# Patient Record
Sex: FEMALE | NOT HISPANIC OR LATINO | Employment: OTHER | URBAN - METROPOLITAN AREA
[De-identification: names, ages, dates, MRNs, and addresses within clinical notes are randomized per-mention and may not be internally consistent; named-entity substitution may affect disease eponyms.]

---

## 2021-02-24 ENCOUNTER — IMMUNIZATION (OUTPATIENT)
Dept: VACCINE CLINIC | Facility: HOSPITAL | Age: 66
End: 2021-02-24

## 2021-02-24 PROCEDURE — 0001A: CPT | Performed by: INTERNAL MEDICINE

## 2021-02-24 PROCEDURE — 91300 HC SARSCOV02 VAC 30MCG/0.3ML IM: CPT | Performed by: INTERNAL MEDICINE

## 2021-03-17 ENCOUNTER — IMMUNIZATION (OUTPATIENT)
Dept: VACCINE CLINIC | Facility: HOSPITAL | Age: 66
End: 2021-03-17

## 2021-03-17 PROCEDURE — 0002A: CPT | Performed by: INTERNAL MEDICINE

## 2021-03-17 PROCEDURE — 91300 HC SARSCOV02 VAC 30MCG/0.3ML IM: CPT | Performed by: INTERNAL MEDICINE

## 2023-04-20 ENCOUNTER — APPOINTMENT (OUTPATIENT)
Dept: GENERAL RADIOLOGY | Facility: HOSPITAL | Age: 68
End: 2023-04-20
Payer: MEDICARE

## 2023-04-20 ENCOUNTER — HOSPITAL ENCOUNTER (EMERGENCY)
Facility: HOSPITAL | Age: 68
Discharge: HOME | End: 2023-04-20
Attending: EMERGENCY MEDICINE
Payer: MEDICARE

## 2023-04-20 ENCOUNTER — APPOINTMENT (OUTPATIENT)
Dept: CT IMAGING | Facility: HOSPITAL | Age: 68
End: 2023-04-20
Payer: MEDICARE

## 2023-04-20 VITALS
SYSTOLIC BLOOD PRESSURE: 124 MMHG | HEIGHT: 63 IN | WEIGHT: 170 LBS | RESPIRATION RATE: 18 BRPM | BODY MASS INDEX: 30.12 KG/M2 | OXYGEN SATURATION: 99 % | TEMPERATURE: 99 F | HEART RATE: 69 BPM | DIASTOLIC BLOOD PRESSURE: 97 MMHG

## 2023-04-20 DIAGNOSIS — K21.9 GASTROESOPHAGEAL REFLUX DISEASE, UNSPECIFIED WHETHER ESOPHAGITIS PRESENT: Primary | ICD-10-CM

## 2023-04-20 DIAGNOSIS — K29.70 GASTRITIS WITHOUT BLEEDING, UNSPECIFIED CHRONICITY, UNSPECIFIED GASTRITIS TYPE: ICD-10-CM

## 2023-04-20 LAB
ALBUMIN SERPL-MCNC: 3.6 G/DL (ref 3.5–5.2)
ALBUMIN/GLOB SERPL: 1.3 G/DL
ALP SERPL-CCNC: 75 U/L (ref 39–117)
ALT SERPL W P-5'-P-CCNC: 14 U/L (ref 1–33)
ANION GAP SERPL CALCULATED.3IONS-SCNC: 7.5 MMOL/L (ref 5–15)
AST SERPL-CCNC: 15 U/L (ref 1–32)
BACTERIA UR QL AUTO: ABNORMAL /HPF
BASOPHILS # BLD AUTO: 0.04 10*3/MM3 (ref 0–0.2)
BASOPHILS NFR BLD AUTO: 0.6 % (ref 0–1.5)
BILIRUB SERPL-MCNC: 0.2 MG/DL (ref 0–1.2)
BILIRUB UR QL STRIP: NEGATIVE
BUN SERPL-MCNC: 13 MG/DL (ref 8–23)
BUN/CREAT SERPL: 11.7 (ref 7–25)
CALCIUM SPEC-SCNC: 8.8 MG/DL (ref 8.6–10.5)
CHLORIDE SERPL-SCNC: 106 MMOL/L (ref 98–107)
CLARITY UR: ABNORMAL
CO2 SERPL-SCNC: 26.5 MMOL/L (ref 22–29)
COLOR UR: YELLOW
CREAT SERPL-MCNC: 1.11 MG/DL (ref 0.57–1)
DEPRECATED RDW RBC AUTO: 49.1 FL (ref 37–54)
EGFRCR SERPLBLD CKD-EPI 2021: 53.9 ML/MIN/1.73
EOSINOPHIL # BLD AUTO: 0.28 10*3/MM3 (ref 0–0.4)
EOSINOPHIL NFR BLD AUTO: 3.9 % (ref 0.3–6.2)
ERYTHROCYTE [DISTWIDTH] IN BLOOD BY AUTOMATED COUNT: 15.5 % (ref 12.3–15.4)
GLOBULIN UR ELPH-MCNC: 2.7 GM/DL
GLUCOSE SERPL-MCNC: 133 MG/DL (ref 65–99)
GLUCOSE UR STRIP-MCNC: NEGATIVE MG/DL
HCT VFR BLD AUTO: 37.1 % (ref 34–46.6)
HGB BLD-MCNC: 11.6 G/DL (ref 12–15.9)
HGB UR QL STRIP.AUTO: NEGATIVE
HOLD SPECIMEN: NORMAL
HOLD SPECIMEN: NORMAL
HYALINE CASTS UR QL AUTO: ABNORMAL /LPF
IMM GRANULOCYTES # BLD AUTO: 0.01 10*3/MM3 (ref 0–0.05)
IMM GRANULOCYTES NFR BLD AUTO: 0.1 % (ref 0–0.5)
KETONES UR QL STRIP: ABNORMAL
LEUKOCYTE ESTERASE UR QL STRIP.AUTO: ABNORMAL
LIPASE SERPL-CCNC: 33 U/L (ref 13–60)
LYMPHOCYTES # BLD AUTO: 2.09 10*3/MM3 (ref 0.7–3.1)
LYMPHOCYTES NFR BLD AUTO: 29.1 % (ref 19.6–45.3)
MAGNESIUM SERPL-MCNC: 2.2 MG/DL (ref 1.6–2.4)
MCH RBC QN AUTO: 26.8 PG (ref 26.6–33)
MCHC RBC AUTO-ENTMCNC: 31.3 G/DL (ref 31.5–35.7)
MCV RBC AUTO: 85.7 FL (ref 79–97)
MONOCYTES # BLD AUTO: 0.61 10*3/MM3 (ref 0.1–0.9)
MONOCYTES NFR BLD AUTO: 8.5 % (ref 5–12)
NEUTROPHILS NFR BLD AUTO: 4.16 10*3/MM3 (ref 1.7–7)
NEUTROPHILS NFR BLD AUTO: 57.8 % (ref 42.7–76)
NITRITE UR QL STRIP: NEGATIVE
NRBC BLD AUTO-RTO: 0 /100 WBC (ref 0–0.2)
PH UR STRIP.AUTO: 6 [PH] (ref 5–8)
PLATELET # BLD AUTO: 289 10*3/MM3 (ref 140–450)
PMV BLD AUTO: 9.9 FL (ref 6–12)
POTASSIUM SERPL-SCNC: 4.2 MMOL/L (ref 3.5–5.2)
PROT SERPL-MCNC: 6.3 G/DL (ref 6–8.5)
PROT UR QL STRIP: NEGATIVE
QT INTERVAL: 396 MS
RBC # BLD AUTO: 4.33 10*6/MM3 (ref 3.77–5.28)
RBC # UR STRIP: ABNORMAL /HPF
REF LAB TEST METHOD: ABNORMAL
SODIUM SERPL-SCNC: 140 MMOL/L (ref 136–145)
SP GR UR STRIP: 1.02 (ref 1–1.03)
SQUAMOUS #/AREA URNS HPF: ABNORMAL /HPF
TROPONIN T SERPL HS-MCNC: 7 NG/L
UROBILINOGEN UR QL STRIP: ABNORMAL
WBC # UR STRIP: ABNORMAL /HPF
WBC NRBC COR # BLD: 7.19 10*3/MM3 (ref 3.4–10.8)
WHOLE BLOOD HOLD COAG: NORMAL
WHOLE BLOOD HOLD SPECIMEN: NORMAL

## 2023-04-20 PROCEDURE — 99284 EMERGENCY DEPT VISIT MOD MDM: CPT

## 2023-04-20 PROCEDURE — 87086 URINE CULTURE/COLONY COUNT: CPT

## 2023-04-20 PROCEDURE — 84484 ASSAY OF TROPONIN QUANT: CPT

## 2023-04-20 PROCEDURE — 70450 CT HEAD/BRAIN W/O DYE: CPT

## 2023-04-20 PROCEDURE — 36415 COLL VENOUS BLD VENIPUNCTURE: CPT

## 2023-04-20 PROCEDURE — 80053 COMPREHEN METABOLIC PANEL: CPT

## 2023-04-20 PROCEDURE — 93010 ELECTROCARDIOGRAM REPORT: CPT | Performed by: INTERNAL MEDICINE

## 2023-04-20 PROCEDURE — 83735 ASSAY OF MAGNESIUM: CPT

## 2023-04-20 PROCEDURE — 81001 URINALYSIS AUTO W/SCOPE: CPT

## 2023-04-20 PROCEDURE — 93005 ELECTROCARDIOGRAM TRACING: CPT

## 2023-04-20 PROCEDURE — 83690 ASSAY OF LIPASE: CPT | Performed by: PHYSICIAN ASSISTANT

## 2023-04-20 PROCEDURE — 85025 COMPLETE CBC W/AUTO DIFF WBC: CPT

## 2023-04-20 PROCEDURE — 71045 X-RAY EXAM CHEST 1 VIEW: CPT

## 2023-04-20 RX ORDER — ALUMINA, MAGNESIA, AND SIMETHICONE 2400; 2400; 240 MG/30ML; MG/30ML; MG/30ML
15 SUSPENSION ORAL ONCE
Status: COMPLETED | OUTPATIENT
Start: 2023-04-20 | End: 2023-04-20

## 2023-04-20 RX ORDER — SUCRALFATE 1 G/1
1 TABLET ORAL 4 TIMES DAILY
Qty: 56 TABLET | Refills: 0 | Status: SHIPPED | OUTPATIENT
Start: 2023-04-20 | End: 2023-05-04

## 2023-04-20 RX ORDER — LIDOCAINE HYDROCHLORIDE 20 MG/ML
15 SOLUTION OROPHARYNGEAL ONCE
Status: COMPLETED | OUTPATIENT
Start: 2023-04-20 | End: 2023-04-20

## 2023-04-20 RX ORDER — SODIUM CHLORIDE 0.9 % (FLUSH) 0.9 %
10 SYRINGE (ML) INJECTION AS NEEDED
Status: DISCONTINUED | OUTPATIENT
Start: 2023-04-20 | End: 2023-04-20 | Stop reason: HOSPADM

## 2023-04-20 RX ADMIN — LIDOCAINE HYDROCHLORIDE 15 ML: 20 SOLUTION ORAL; TOPICAL at 15:58

## 2023-04-20 RX ADMIN — ALUMINUM HYDROXIDE, MAGNESIUM HYDROXIDE, AND DIMETHICONE 15 ML: 400; 400; 40 SUSPENSION ORAL at 15:56

## 2023-04-20 NOTE — ED TRIAGE NOTES
Tyrone sutherland nurse saw pt today. States that she had some confusion and low blood pressure today. Family reports being off balance with headache for the last couple days. Pt reports fatigue. Pt answers all questions appropriately.

## 2023-04-20 NOTE — ED PROVIDER NOTES
TRIAGE PROVIDER NOTE    I personally performed a brief face-to-face medical evaluation of the patient upon their arrival to the emergency department.  This exam was performed in an effort to decrease the time from intake to seeing a provider and to decrease delays in care.  The history of present illness is condensed.  Other providers may see the patient as well if deemed necessary after this evaluation.    HPI: Meghan Adrian is a 68 y.o. female with a PMH significant for GERD who presents to the ED c/o epigastric abdominal pain with intermittent lightheadedness over the past couple of weeks.  The patient has fallen several times secondary to lightheadedness that is primarily present with position change.  She admits to nausea with several episodes of vomiting.  She relates the symptoms to her extensive history of reflux and gastric band surgery.  No recent abdominal surgeries.  No injuries over the past several weeks.  She does admit to an aching discomfort in her epigastrium that waxes and wanes in intensity with no obvious provocative activity over the course of that time.      FOCUSED PHYSICAL EXAM:  ED Triage Vitals   Temp Heart Rate Resp BP SpO2   04/20/23 1450 04/20/23 1450 04/20/23 1450 04/20/23 1455 04/20/23 1450   99 °F (37.2 °C) 97 18 114/78 96 %      Temp src Heart Rate Source Patient Position BP Location FiO2 (%)   -- 04/20/23 1455 04/20/23 1455 04/20/23 1455 --    Monitor Sitting Left arm      General: No acute distress  Lungs: Clear to auscultation bilaterally, no wheezes  Heart: Regular rate and rhythm, no murmur  Abdomen: Soft, nontender, nondistended, skin several scattered abdominal incisional scars from remote operations.  Extremities: No gross injury or deformity      ASSESSMENT/PLAN:  Patient presentation and evaluation initially concerning for possible dehydration.  She does have nausea and vomiting related to GERD and reflux symptoms that have been causing her to be off balance and  lightheaded.  Plan for abdominal pain evaluation with basic labs and lipase as well as cardiac evaluation with troponin and EKG.    Orders Placed This Encounter   Procedures   • XR Chest 1 View   • CT Head Without Contrast   • [place] Draw   • Comprehensive Metabolic Panel   • Single High Sensitivity Troponin T   • Magnesium   • Urinalysis With Culture If Indicated -   • CBC Auto Differential   • Lipase   • NPO Diet NPO Type: Strict NPO   • Undress & Gown   • Cardiac Monitoring   • Continuous Pulse Oximetry   • Vital Signs   • Orthostatic Blood Pressure   • Orthostatic Vitals   • Oxygen Therapy- Nasal Cannula; 2 LPM; Titrate for SPO2: 92%, Greater Than or Equal To   • POC Glucose Once   • ECG 12 Lead ED Triage Standing Order; Weak / Dizzy / AMS   • Insert Peripheral IV   • Fall Precautions   • CBC & Differential   • Green Top (Gel)   • Lavender Top   • Gold Top - SST   • Light Blue Top        Provider Attestation:  I personally reviewed the past medical history, past surgical history, social history, family history, current medications, and allergies as they appear in the chart.    1515 I, Jose Daniel Pichardo PA-C, I evaluated the patient briefly at triage and performed an evaluation.  The contents of this note to this point have been provided by myself.     ***

## 2023-04-21 LAB — BACTERIA SPEC AEROBE CULT: NO GROWTH

## 2023-04-21 NOTE — DISCHARGE INSTRUCTIONS
Avoid alcohol caffeine nicotine and foods and drinks that specifically worsen your symptoms  Take the Carafate as prescribed, you can also try over-the-counter Gaviscon as needed for breakthrough symptoms  Call your gastroenterologist tomorrow to schedule follow-up  Stay well-hydrated

## 2023-04-21 NOTE — ED PROVIDER NOTES
MD ATTESTATION NOTE    The TOM and I have discussed this patient's history, physical exam, and treatment plan.  I have reviewed the documentation and personally had a face to face interaction with the patient. I affirm the documentation and agree with the treatment and plan.  The attached note describes my personal findings.      I provided a substantive portion of the care of the patient.  I personally performed the physical exam in its entirety, and below are my findings.  For this patient encounter, the patient wore surgical mask, I wore full protective PPE including N95 and eye protection.      Brief HPI: This patient presents for evaluation of some abdominal discomfort and  lightheadedness symptoms.  This is been going on for couple weeks apparently and she has had a very poor appetite with decreased food and liquid intake.  Apparently a health representative came to her house today to do a basic checkup and they were concerned because her blood pressure was reportedly low with systolic in the 90s range.  At the time of my evaluation, patient denies any chest pain or abdominal pain or difficulties breathing.    PHYSICAL EXAM  ED Triage Vitals   Temp Heart Rate Resp BP SpO2   04/20/23 1450 04/20/23 1450 04/20/23 1450 04/20/23 1455 04/20/23 1450   99 °F (37.2 °C) 97 18 114/78 96 %      Temp src Heart Rate Source Patient Position BP Location FiO2 (%)   -- 04/20/23 1455 04/20/23 1455 04/20/23 1455 --    Monitor Sitting Left arm          GENERAL: Pleasant lady, resting calmly bed, smiling, no diaphoresis, no acute distress  HENT: nares patent normocephalic and atraumatic, moist mucous membrane  EYES: no scleral icterus, EOMI  CV: regular rhythm, normal rate  RESPIRATORY: normal effort, lungs clear bilaterally  ABDOMEN: soft, no focal areas of tenderness to deep palpation, no masses palpable  MUSCULOSKELETAL: no deformity, no asymmetry  NEURO: alert, moves all extremities, follows commands  PSYCH:  calm,  "cooperative  SKIN: warm, dry    Vital signs and nursing notes reviewed.        Plan: Typical cardiac work-up initiated and her EKG and troponin were reassuring here.  Patient has not had any chest pain throughout this visit.  CT of the head was also ordered and it is reassuring without any worrisome intracranial findings.  Complete metabolic labs initiated and we also checked a lipase and urinalysis because of her complaint of abdominal discomfort.  There was 3+ bacteria in the urinalysis but it seemed to be contaminated with up to 20 squamous epithelial cells as well.  Patient does not have any flank pain or dysuria symptoms at all.  She is afebrile and nontoxic-appearing.  We observed her in the department for several hours here.  Her blood pressure remained normotensive throughout that entire time.  She is overall nontoxic-appearing.  I think she is safe to discharge home for continued outpatient management.  Advise prompt follow-up with PCP for repeat evaluation as soon as possible this week.  Discussed with the patient and her daughter and her friend the usual \"return to ER\" instructions for any worsening signs or symptoms if they arise.     Wei Castelan MD  04/20/23 2737    "

## 2023-06-06 ENCOUNTER — HOSPITAL ENCOUNTER (INPATIENT)
Facility: HOSPITAL | Age: 68
LOS: 3 days | Discharge: HOME | DRG: 544 | End: 2023-06-09
Attending: EMERGENCY MEDICINE | Admitting: INTERNAL MEDICINE
Payer: MEDICARE

## 2023-06-06 ENCOUNTER — APPOINTMENT (OUTPATIENT)
Dept: CT IMAGING | Facility: HOSPITAL | Age: 68
DRG: 544 | End: 2023-06-06
Payer: MEDICARE

## 2023-06-06 DIAGNOSIS — S22.059A CLOSED FRACTURE OF FIFTH THORACIC VERTEBRA, UNSPECIFIED FRACTURE MORPHOLOGY, INITIAL ENCOUNTER: Primary | ICD-10-CM
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
## 2023-06-06 LAB
ALBUMIN SERPL-MCNC: 3.6 G/DL (ref 3.5–5.2)
ALBUMIN/GLOB SERPL: 1.4 G/DL
ALP SERPL-CCNC: 62 U/L (ref 39–117)
ALT SERPL W P-5'-P-CCNC: 12 U/L (ref 1–33)
ANION GAP SERPL CALCULATED.3IONS-SCNC: 10 MMOL/L (ref 5–15)
AST SERPL-CCNC: 17 U/L (ref 1–32)
BASOPHILS # BLD AUTO: 0.04 10*3/MM3 (ref 0–0.2)
BASOPHILS NFR BLD AUTO: 0.6 % (ref 0–1.5)
BILIRUB SERPL-MCNC: <0.2 MG/DL (ref 0–1.2)
BUN SERPL-MCNC: 17 MG/DL (ref 8–23)
BUN/CREAT SERPL: 15.7 (ref 7–25)
CALCIUM SPEC-SCNC: 8.9 MG/DL (ref 8.6–10.5)
CHLORIDE SERPL-SCNC: 110 MMOL/L (ref 98–107)
CO2 SERPL-SCNC: 22 MMOL/L (ref 22–29)
CREAT SERPL-MCNC: 1.08 MG/DL (ref 0.57–1)
DEPRECATED RDW RBC AUTO: 44.5 FL (ref 37–54)
EGFRCR SERPLBLD CKD-EPI 2021: 55.7 ML/MIN/1.73
EOSINOPHIL # BLD AUTO: 0.28 10*3/MM3 (ref 0–0.4)
EOSINOPHIL NFR BLD AUTO: 4 % (ref 0.3–6.2)
ERYTHROCYTE [DISTWIDTH] IN BLOOD BY AUTOMATED COUNT: 14.6 % (ref 12.3–15.4)
GLOBULIN UR ELPH-MCNC: 2.6 GM/DL
GLUCOSE SERPL-MCNC: 98 MG/DL (ref 65–99)
HCT VFR BLD AUTO: 32.6 % (ref 34–46.6)
HGB BLD-MCNC: 10.5 G/DL (ref 12–15.9)
IMM GRANULOCYTES # BLD AUTO: 0.02 10*3/MM3 (ref 0–0.05)
IMM GRANULOCYTES NFR BLD AUTO: 0.3 % (ref 0–0.5)
LYMPHOCYTES # BLD AUTO: 2.64 10*3/MM3 (ref 0.7–3.1)
LYMPHOCYTES NFR BLD AUTO: 37.3 % (ref 19.6–45.3)
MCH RBC QN AUTO: 27.1 PG (ref 26.6–33)
MCHC RBC AUTO-ENTMCNC: 32.2 G/DL (ref 31.5–35.7)
MCV RBC AUTO: 84 FL (ref 79–97)
MONOCYTES # BLD AUTO: 0.67 10*3/MM3 (ref 0.1–0.9)
MONOCYTES NFR BLD AUTO: 9.5 % (ref 5–12)
NEUTROPHILS NFR BLD AUTO: 3.43 10*3/MM3 (ref 1.7–7)
NEUTROPHILS NFR BLD AUTO: 48.3 % (ref 42.7–76)
NRBC BLD AUTO-RTO: 0 /100 WBC (ref 0–0.2)
PLATELET # BLD AUTO: 248 10*3/MM3 (ref 140–450)
PMV BLD AUTO: 10 FL (ref 6–12)
POTASSIUM SERPL-SCNC: 4 MMOL/L (ref 3.5–5.2)
PROT SERPL-MCNC: 6.2 G/DL (ref 6–8.5)
RBC # BLD AUTO: 3.88 10*6/MM3 (ref 3.77–5.28)
SODIUM SERPL-SCNC: 142 MMOL/L (ref 136–145)
WBC NRBC COR # BLD: 7.08 10*3/MM3 (ref 3.4–10.8)

## 2023-06-06 PROCEDURE — 85025 COMPLETE CBC W/AUTO DIFF WBC: CPT | Performed by: INTERNAL MEDICINE

## 2023-06-06 PROCEDURE — 71250 CT THORAX DX C-: CPT

## 2023-06-06 PROCEDURE — 80053 COMPREHEN METABOLIC PANEL: CPT | Performed by: INTERNAL MEDICINE

## 2023-06-06 PROCEDURE — 99284 EMERGENCY DEPT VISIT MOD MDM: CPT

## 2023-06-06 RX ORDER — HYDROCODONE BITARTRATE AND ACETAMINOPHEN 7.5; 325 MG/1; MG/1
1 TABLET ORAL EVERY 6 HOURS PRN
COMMUNITY
End: 2023-06-09 | Stop reason: HOSPADM

## 2023-06-06 RX ORDER — SODIUM CHLORIDE 0.9 % (FLUSH) 0.9 %
10 SYRINGE (ML) INJECTION AS NEEDED
Status: DISCONTINUED | OUTPATIENT
Start: 2023-06-06 | End: 2023-06-09 | Stop reason: HOSPADM

## 2023-06-06 RX ORDER — HYDROCODONE BITARTRATE AND ACETAMINOPHEN 5; 325 MG/1; MG/1
1 TABLET ORAL EVERY 4 HOURS PRN
Status: DISCONTINUED | OUTPATIENT
Start: 2023-06-06 | End: 2023-06-07 | Stop reason: SDUPTHER

## 2023-06-06 RX ORDER — ERGOCALCIFEROL 1.25 MG/1
1 CAPSULE ORAL WEEKLY
COMMUNITY

## 2023-06-06 RX ORDER — NALOXONE HCL 0.4 MG/ML
0.4 VIAL (ML) INJECTION
Status: DISCONTINUED | OUTPATIENT
Start: 2023-06-06 | End: 2023-06-09 | Stop reason: HOSPADM

## 2023-06-06 RX ORDER — BUSPIRONE HYDROCHLORIDE 10 MG/1
1 TABLET ORAL 2 TIMES DAILY
COMMUNITY

## 2023-06-06 RX ORDER — MORPHINE SULFATE 2 MG/ML
1 INJECTION, SOLUTION INTRAMUSCULAR; INTRAVENOUS EVERY 4 HOURS PRN
Status: DISCONTINUED | OUTPATIENT
Start: 2023-06-06 | End: 2023-06-09 | Stop reason: HOSPADM

## 2023-06-06 RX ORDER — CITALOPRAM 40 MG/1
1 TABLET ORAL DAILY
COMMUNITY

## 2023-06-06 RX ORDER — ATORVASTATIN CALCIUM 40 MG/1
1 TABLET, FILM COATED ORAL DAILY
COMMUNITY

## 2023-06-06 RX ORDER — AMOXICILLIN 250 MG
2 CAPSULE ORAL 2 TIMES DAILY
Status: DISCONTINUED | OUTPATIENT
Start: 2023-06-06 | End: 2023-06-09 | Stop reason: HOSPADM

## 2023-06-06 RX ORDER — ACETAMINOPHEN 500 MG
1000 TABLET ORAL ONCE
Status: COMPLETED | OUTPATIENT
Start: 2023-06-06 | End: 2023-06-06

## 2023-06-06 RX ORDER — DEXLANSOPRAZOLE 60 MG/1
1 CAPSULE, DELAYED RELEASE ORAL DAILY
COMMUNITY

## 2023-06-06 RX ORDER — POLYETHYLENE GLYCOL 3350 17 G/17G
17 POWDER, FOR SOLUTION ORAL DAILY PRN
Status: DISCONTINUED | OUTPATIENT
Start: 2023-06-06 | End: 2023-06-09 | Stop reason: HOSPADM

## 2023-06-06 RX ORDER — SODIUM CHLORIDE 0.9 % (FLUSH) 0.9 %
10 SYRINGE (ML) INJECTION EVERY 12 HOURS SCHEDULED
Status: DISCONTINUED | OUTPATIENT
Start: 2023-06-06 | End: 2023-06-09 | Stop reason: HOSPADM

## 2023-06-06 RX ORDER — CEPHALEXIN 250 MG/1
250 CAPSULE ORAL NIGHTLY
COMMUNITY
End: 2023-06-09 | Stop reason: HOSPADM

## 2023-06-06 RX ORDER — BISACODYL 5 MG/1
5 TABLET, DELAYED RELEASE ORAL DAILY PRN
Status: DISCONTINUED | OUTPATIENT
Start: 2023-06-06 | End: 2023-06-09 | Stop reason: HOSPADM

## 2023-06-06 RX ORDER — ONDANSETRON 2 MG/ML
4 INJECTION INTRAMUSCULAR; INTRAVENOUS EVERY 6 HOURS PRN
Status: DISCONTINUED | OUTPATIENT
Start: 2023-06-06 | End: 2023-06-09 | Stop reason: HOSPADM

## 2023-06-06 RX ORDER — ALENDRONATE SODIUM 35 MG/1
1 TABLET ORAL
COMMUNITY

## 2023-06-06 RX ORDER — BUPROPION HYDROCHLORIDE 300 MG/1
1 TABLET ORAL DAILY
COMMUNITY

## 2023-06-06 RX ORDER — CETIRIZINE HYDROCHLORIDE 10 MG/1
1 TABLET ORAL DAILY
COMMUNITY

## 2023-06-06 RX ORDER — FLUCONAZOLE 100 MG/1
100 TABLET ORAL DAILY
COMMUNITY
End: 2023-06-09 | Stop reason: HOSPADM

## 2023-06-06 RX ORDER — AMITRIPTYLINE HYDROCHLORIDE 100 MG/1
1 TABLET, FILM COATED ORAL NIGHTLY
COMMUNITY

## 2023-06-06 RX ORDER — BISACODYL 10 MG
10 SUPPOSITORY, RECTAL RECTAL DAILY PRN
Status: DISCONTINUED | OUTPATIENT
Start: 2023-06-06 | End: 2023-06-09 | Stop reason: HOSPADM

## 2023-06-06 RX ORDER — FOLIC ACID 1 MG/1
1 TABLET ORAL DAILY
COMMUNITY

## 2023-06-06 RX ORDER — SODIUM CHLORIDE 9 MG/ML
40 INJECTION, SOLUTION INTRAVENOUS AS NEEDED
Status: DISCONTINUED | OUTPATIENT
Start: 2023-06-06 | End: 2023-06-09 | Stop reason: HOSPADM

## 2023-06-06 RX ORDER — FAMOTIDINE 20 MG/1
20 TABLET, FILM COATED ORAL 2 TIMES DAILY PRN
COMMUNITY
End: 2023-06-09 | Stop reason: HOSPADM

## 2023-06-06 RX ORDER — METOPROLOL SUCCINATE 50 MG/1
1 TABLET, EXTENDED RELEASE ORAL DAILY
COMMUNITY

## 2023-06-06 RX ORDER — LIDOCAINE 50 MG/G
1 PATCH TOPICAL ONCE
Status: COMPLETED | OUTPATIENT
Start: 2023-06-06 | End: 2023-06-07

## 2023-06-06 RX ADMIN — LIDOCAINE 1 PATCH: 50 PATCH CUTANEOUS at 17:27

## 2023-06-06 RX ADMIN — ACETAMINOPHEN 1000 MG: 500 TABLET, FILM COATED ORAL at 16:30

## 2023-06-06 NOTE — ED NOTES
"Nursing report ED to floor  Meghan Adrian  68 y.o.  female    HPI :   Chief Complaint   Patient presents with    Fall       Admitting doctor:   Shelby Sales MD    Admitting diagnosis:   The encounter diagnosis was Closed fracture of fifth thoracic vertebra, unspecified fracture morphology, initial encounter.    Code status:   Current Code Status       Date Active Code Status Order ID Comments User Context       6/6/2023 1930 CPR (Attempt to Resuscitate) [id number]  Shelby Sales MD ED        Question Answer    Code Status (Patient has no pulse and is not breathing) CPR (Attempt to Resuscitate)    Medical Interventions (Patient has pulse or is breathing) Full Support                    Allergies:   Patient has no known allergies.    Isolation:   No active isolations    Intake and Output  No intake or output data in the 24 hours ending 06/06/23 1950    Weight:       06/06/23  1424   Weight: 77.1 kg (170 lb)       Most recent vitals:   Vitals:    06/06/23 1423 06/06/23 1424   BP:  148/89   Pulse: 94    Resp: 18    Temp: 97.1 °F (36.2 °C)    SpO2: 94%    Weight:  77.1 kg (170 lb)   Height:  160 cm (63\")       Active LDAs/IV Access:   Lines, Drains & Airways       Active LDAs       Name Placement date Placement time Site Days    Peripheral IV 06/06/23 1910 Left Antecubital 06/06/23 1910  Antecubital  less than 1                    Labs (abnormal labs have a star):   Labs Reviewed - No data to display    EKG:   No orders to display       Meds given in ED:   Medications   lidocaine (LIDODERM) 5 % 1 patch (1 patch Transdermal Medication Applied 6/6/23 1727)   acetaminophen (TYLENOL) tablet 1,000 mg (1,000 mg Oral Given 6/6/23 1630)       Imaging results:  No radiology results for the last day    Ambulatory status:   - w/ assist    Social issues:   Social History     Socioeconomic History    Marital status: Unknown       NIH Stroke Scale:         Alexx Bullock RN  06/06/23 19:50 EDT       "

## 2023-06-06 NOTE — PROGRESS NOTES
Clinical Pharmacy Services: Medication History    Meghan Adrian is a 68 y.o. female presenting to [facility] for   Chief Complaint   Patient presents with    Fall       She  has no past medical history on file.    Allergies as of 06/06/2023    (No Known Allergies)       Medication information was obtained from: Patient   Pharmacy and Phone Number:     Prior to Admission Medications       Prescriptions Last Dose Informant Patient Reported? Taking?    alendronate (FOSAMAX) 35 MG tablet  Self Yes Yes    Take 1 tablet by mouth Every 7 (Seven) Days.    amitriptyline (ELAVIL) 100 MG tablet 6/5/2023 Self Yes Yes    Take 1 tablet by mouth Every Night.    atorvastatin (LIPITOR) 40 MG tablet 6/6/2023 Self Yes Yes    Take 1 tablet by mouth Daily.    buPROPion XL (WELLBUTRIN XL) 300 MG 24 hr tablet 6/6/2023 Self Yes Yes    Take 1 tablet by mouth Daily.    busPIRone (BUSPAR) 10 MG tablet 6/6/2023 Self Yes Yes    Take 1 tablet by mouth 2 (Two) Times a Day.    cephalexin (KEFLEX) 250 MG capsule 6/5/2023 Self Yes Yes    Take 1 capsule by mouth Every Night.    cetirizine (zyrTEC) 10 MG tablet 6/6/2023 Self Yes Yes    Take 1 tablet by mouth Daily.    citalopram (CeleXA) 40 MG tablet 6/6/2023 Self Yes Yes    Take 1 tablet by mouth Daily.    Cyanocobalamin 1000 MCG/ML kit  Self Yes Yes    Inject 1,000 mcg as directed Every 30 (Thirty) Days.    dexlansoprazole (DEXILANT) 60 MG capsule 6/6/2023 Self Yes Yes    Take 1 capsule by mouth Daily.    famotidine (PEPCID) 20 MG tablet 6/6/2023 Self Yes Yes    Take 1 tablet by mouth 2 (Two) Times a Day As Needed for Heartburn.    fluconazole (DIFLUCAN) 100 MG tablet 6/6/2023 Self Yes Yes    Take 1 tablet by mouth Daily.    folic acid (FOLVITE) 1 MG tablet 6/6/2023 Self Yes Yes    Take 1 tablet by mouth Daily.    HYDROcodone-acetaminophen (NORCO) 7.5-325 MG per tablet 6/6/2023 Self Yes Yes    Take 1 tablet by mouth Every 6 (Six) Hours As Needed for Moderate Pain.    metoprolol  succinate XL (TOPROL-XL) 50 MG 24 hr tablet 6/6/2023 Self Yes Yes    Take 1 tablet by mouth Daily.    vitamin D (ERGOCALCIFEROL) 1.25 MG (67858 UT) capsule capsule  Self Yes Yes    Take 1 capsule by mouth 1 (One) Time Per Week.              Medication notes:     This medication list is complete to the best of my knowledge as of 6/6/2023    Please call if questions.    Seble Garcia  Medication History Technician   [phone]    6/6/2023 19:33 EDT

## 2023-06-06 NOTE — ED NOTES
Patient reports generalized chest pain after falling forward down a hill last night. Patient reports increased pain w/ inspiration, cough, movement. Patient denies LOC, blood thinner use, neck pain, headache, blurred vision, nausea.

## 2023-06-06 NOTE — ED NOTES
Patient from home via pv; c/o a mechanical fall and injured the front part of her torso. Patient referred to er by pcp for chest xray.

## 2023-06-06 NOTE — ED PROVIDER NOTES
EMERGENCY DEPARTMENT ENCOUNTER    Room Number:  T02/02  Date of encounter:  6/6/2023  PCP: Provider, No Known  Historian: Patient  Full history not obtainable due to: None    HPI:  Chief Complaint: Chest wall pain    Context: Meghan Adrian is a 68 y.o. female who presents to the ED c/o aching discomfort to the anterior chest as well as bilateral ribs since a fall last night.  The patient lost her balance and fell forward onto a grass surface and has been having pain since then.  She has not attempted to alleviate symptoms prior to arrival with medications.  She was sent from her doctor's office today for further evaluation after complaining of the symptoms this morning.  She denies shortness of breath.  She does not believe she struck her head on the ground and certainly believes that she did not lose consciousness.  Does not take any blood thinners.  There is no neck pain.  There is no abdominal pain.      MEDICAL RECORD REVIEW:    Upon review of the medical record it appears the patient was evaluated in the office today with neurology for vitamin B12 deficiency and memory loss.  She had a normal urine culture on 4/20/2023 and a normal lipase on that same date.    PAST MEDICAL HISTORY    Active Ambulatory Problems     Diagnosis Date Noted    No Active Ambulatory Problems     Resolved Ambulatory Problems     Diagnosis Date Noted    No Resolved Ambulatory Problems     No Additional Past Medical History         PAST SURGICAL HISTORY  No past surgical history on file.      FAMILY HISTORY  No family history on file.      SOCIAL HISTORY  Social History     Socioeconomic History    Marital status: Unknown         ALLERGIES  Patient has no known allergies.        REVIEW OF SYSTEMS    All systems reviewed and marked as negative except as listed in HPI     PHYSICAL EXAM    I have reviewed the triage vital signs and nursing notes.    ED Triage Vitals   Temp Heart Rate Resp BP SpO2   06/06/23 1423 06/06/23 1423 06/06/23 1423  06/06/23 1424 06/06/23 1423   97.1 °F (36.2 °C) 94 18 148/89 94 %      Temp src Heart Rate Source Patient Position BP Location FiO2 (%)   -- -- -- -- --              Physical Exam  Constitutional:       General: She is not in acute distress.     Appearance: She is well-developed.   HENT:      Head: Normocephalic and atraumatic.   Eyes:      General: No scleral icterus.     Conjunctiva/sclera: Conjunctivae normal.   Neck:      Trachea: No tracheal deviation.   Cardiovascular:      Rate and Rhythm: Normal rate and regular rhythm.   Pulmonary:      Effort: Pulmonary effort is normal.      Breath sounds: Normal breath sounds.   Chest:       Abdominal:      Palpations: Abdomen is soft.      Tenderness: There is no abdominal tenderness.   Musculoskeletal:         General: No deformity.      Cervical back: Normal range of motion.   Lymphadenopathy:      Cervical: No cervical adenopathy.   Skin:     General: Skin is warm and dry.   Neurological:      Mental Status: She is alert and oriented to person, place, and time.   Psychiatric:         Behavior: Behavior normal.       Vital signs and nursing notes reviewed.            LAB RESULTS  No results found for this or any previous visit (from the past 24 hour(s)).    Ordered the above labs and independently reviewed the results.        RADIOLOGY  CT Chest Without Contrast Diagnostic    Result Date: 6/6/2023  CT CHEST WO CONTRAST DIAGNOSTIC-  Radiation dose reduction techniques were utilized, including automated exposure control and exposure modulation based on body size.  Clinical: Fell, anterior bilateral rib and sternal pain  FINDINGS: Compression deformity of the T5 vertebrae with approximately 50% volume loss. No sternal fracture, respiratory motion artifact. No rib fracture seen.  There is a lap band in position. There is atherosclerotic calcification of a normal diameter aorta. Cardiac size within normal limits, no pericardial abnormality seen. No mediastinal or hilar  mass/lymphadenopathy. Fine linear area of scarring versus discoid atelectasis at the base of the right lower lobe. The right lung is otherwise clear. The left lung is clear. No pleural effusion or suspicious pulmonary lesion seen. Limited images through the upper abdomen are unremarkable.  CONCLUSION: 1. Compression deformity T5 vertebrae. 2. No rib or sternal fracture seen. 3. No acute intrathoracic abnormality.   This report was finalized on 6/6/2023 5:30 PM by Dr. Santiago Cornelius M.D.       I ordered the above noted radiological studies. Independently reviewed by me and discussed with radiologist.  See dictation above for official radiology interpretation.      PROCEDURES    Procedures        MEDICATIONS GIVEN IN ER    Medications   lidocaine (LIDODERM) 5 % 1 patch (1 patch Transdermal Medication Applied 6/6/23 1727)   acetaminophen (TYLENOL) tablet 1,000 mg (1,000 mg Oral Given 6/6/23 1630)         PROGRESS, DATA ANALYSIS, CONSULTS, AND MEDICAL DECISION MAKING    All labs have been independently interpreted by me.  All radiology studies have been interpreted by me.  Discussion below represents my analysis of pertinent findings related to patient's condition, differential diagnosis, treatment plan and final disposition.    Patient presentation and evaluation consistent with uncomplicated thoracic compression fracture with 50% loss of height.  Due to the patient's pain and difficulty with ambulation secondary to her fracture I feel most appropriate to admit her to the hospital upon the recommendation from neurosurgery for MRI and neurosurgical consultation.  Patient and family are agreeable.    - Chronic or social conditions impacting care: None      DIFFERENTIAL DIAGNOSIS INCLUDE BUT NOT LIMITED TO:     Sternal fracture, rib fracture, vertebral fracture      Orders placed during this visit:  Orders Placed This Encounter   Procedures    CT Chest Without Contrast Diagnostic    Neurosurgery (on-call MD unless  specified)    [facility] (on-call MD unless specified) Details    Inpatient Admission         ED Course as of 06/06/23 1852 Tue Jun 06, 2023 1835 I discussed the case with MD Kusum with NS at this time regarding the patient.  I discussed work-up, results, concerns.  I discussed the consulting provider's desire for admission for NS consult and MRI   [DC]   1850 I discussed the case with MD Henrry at this time regarding the patient.  I discussed work-up, results, concerns.  I discussed the consulting provider's desire for med surg admit.   [DC]      ED Course User Index  [DC] Jose Daniel Pichardo PA       AS OF 18:52 EDT VITALS:    BP - 148/89  HR - 94  TEMP - 97.1 °F (36.2 °C)  02 SATS - 94%  Admit    1854 I rechecked the patient.  I discussed the patient's labs, radiology findings (including all incidental findings), diagnosis, and plan for admission. The patient understands and agrees with the plan.      DIAGNOSIS  Final diagnoses:   Closed fracture of fifth thoracic vertebra, unspecified fracture morphology, initial encounter         DISPOSITION  Admit    Pt masked in first look. I wore a surgical mask throughout my encounters with the pt. I performed hand hygiene on entry into the pt room and upon exit.     Dictated utilizing Dragon dictation     Note Disclaimer: At [facility], we believe that sharing information builds trust and better relationships. You are receiving this note because you recently visited [facility]. It is possible you will see health information before a provider has talked with you about it. This kind of information can be easy to misunderstand. To help you fully understand what it means for your health, we urge you to discuss this note with your provider.      Jose Daniel Pichardo PA  06/06/23 1855

## 2023-06-06 NOTE — ED PROVIDER NOTES
MD ATTESTATION NOTE    The TOM and I have discussed this patient's history, physical exam, and treatment plan.  I have reviewed the documentation and personally had a face to face interaction with the patient. I affirm the documentation and agree with the treatment and plan.  The attached note describes my personal findings.      I provided a substantive portion of the care of the patient.  I personally performed the physical exam in its entirety, and below are my findings.  For this patient encounter, the patient wore surgical mask, I wore full protective PPE including N95 and eye protection.      Brief HPI: Patient presents for evaluation of fall.  Patient states she fell last night down a hill.  Patient states she has pain in both ribs.  Did not hit her head.  Did not get knocked unconscious.  No abdominal pain.    PHYSICAL EXAM  ED Triage Vitals   Temp Heart Rate Resp BP SpO2   06/06/23 1423 06/06/23 1423 06/06/23 1423 06/06/23 1424 06/06/23 1423   97.1 °F (36.2 °C) 94 18 148/89 94 %      Temp src Heart Rate Source Patient Position BP Location FiO2 (%)   -- -- -- -- --                GENERAL: no acute distress  HENT: nares patent  EYES: no scleral icterus  CV: regular rhythm, normal rate  RESPIRATORY: normal effort. Tender bilateral chest   ABDOMEN: soft  MUSCULOSKELETAL: no deformity  NEURO: alert, moves all extremities, follows commands  PSYCH:  calm, cooperative  SKIN: warm, dry    Vital signs and nursing notes reviewed.        Plan: CT scan of the chest       Christos Boateng MD  06/06/23 3097

## 2023-06-06 NOTE — H&P
Internal medicine history and physical  INTERNAL MEDICINE   [facility]       Patient Identification:  Name: Meghan Adrian  Age: 68 y.o.  Sex: female  :  1955  MRN: [id number]                   Primary Care Physician: Provider, No Known                               Date of admission:2023    Chief Complaint: Tripped and fell face forward hitting her chest and knees on the ground last night while walking down the slope to help get the dog who got out of the house    History of Present Illness:   Patient is a 68-year-old female who has been evaluated for progressive memory loss over the course of the last year and a half and has had formal neuropsych evaluation at [facility] in 2022.  Patient has underlying history of hypertension, dyslipidemia and untreated sleep apnea.  Patient has history of frequent falls and did fall and 2022 when she was walking up the stairs and resulted in fracture of her right ankle.  She is also dealing with issues of grief and her   from hemorrhagic stroke last year.  In this background patient was visiting her daughter and her dog got out of the house.  She was going up to the dog and running down the hill when she lost her balance and fell face forward and developed bruise in her knees but did not hit her face.  She was able to get up and walk around and did not have any issues with weakness of arm or legs or inability to control her bowel and bladder but started complaining of discomfort in the chest and ribs.  Because of that she called her primary care provider and was sent to the emergency room for evaluation and x-rays.  Work-up in the emergency room including CT scan of the chest and head which showed evidence of T5 fracture.  No other acute process is seen.  Patient's case was discussed with spine surgery/neurosurgery on-call and it was recommended that patient would need hospitalization to monitor her clinical course and  evaluation by neurosurgery service to determine the best method of treatment.      Past Medical History:   Allergic rhinitis 07/13/2015    Arthritis 07/13/2015    Benign essential hypertension 03/11/2019    Diverticular disease 02/23/2016    Dysfunction of Eustachian tube 11/29/2018    Dyspnea 10/23/2015    Elevated fasting glucose 04/12/2016    Fatigue 04/05/2016    Gastroesophageal reflux disease 07/13/2015    Heart valve disorder 11/11/2015    Mixed hyperlipidemia 07/13/2015    Polyp of colon 05/07/2018    Patient encounter status 09/23/2014    Past history of procedure 11/11/2015    Body mass index 30+ - obesity 06/26/2018    Palpitations 10/14/2015    Needs influenza immunization 10/08/2019    Mixed anxiety and depressive disorder 07/13/2015    Melanocytic nevus of skin 02/02/2017    Insomnia 01/12/2016    Impacted cerumen 11/29/2018    Viral screening status 05/24/2018    Urinary tract infection 06/22/2014    Tachycardia 07/13/2015    Small bowel obstruction 06/22/2014    Requires a hepatitis A vaccination 04/26/2018    Prediabetes 01/11/2017    Polyphagia 07/12/2017    Vitamin D deficiency 06/10/2021    Neoplasm of uncertain behavior of skin 10/11/2021    Memory loss 07/12/2022    Bilateral impacted cerumen 07/12/2022    Serum creatinine raised 07/12/2022    Peripheral vascular disease 10/21/2022    Bladder muscle dysfunction - overactive 09/12/2022    Chronic back pain 09/12/2022    Sleep apnea 09/12/2022    Overactive bladder 12/06/2022    Hypertension 12/06/2022    Gastritis 05/07/2023    Hypotension 05/07/2023     Past Surgical History:   BACK SURGERY    BREAST SURGERY   breast reduction    CHOLECYSTECTOMY    COLONOSCOPY    ESOPHAGOGASTRODUODENOSCOPY    GALLBLADDER SURGERY    GASTRIC BYPASS    TUBAL LIGATION   Home Meds:   alendronate (FOSAMAX) 35 mg, Oral, Every 7 days, Take in the morning with a full glass of water, on an empty stomach, and do not take anything else by mouth or lie down for the next 30  "min.    amitriptyline (ELAVIL) 100 mg, Oral, Daily    atorvastatin (LIPITOR) 40 mg, Oral, Daily    buPROPion XL (WELLBUTRIN XL) 300 mg, Oral, Every morning, Do not crush, chew, or split.    busPIRone (BUSPAR) 10 mg, Oral, 2 times daily RT    cephalexin (KEFLEX) 250 mg, Oral, Nightly    cetirizine (ZYRTEC) 10 mg, Oral, Daily    citalopram (CeleXA) 40 MG tablet TAKE 1 TABLET BY MOUTH EVERY DAY    dexlansoprazole (DEXILANT) 60 mg, Oral, Daily, Do not crush or chew.    ergocalciferol (Vitamin D2) 1.25 MG (70442 UT) capsule Take 1 capsule (50,000 Units total) by mouth 1 (one) time per week    famotidine (PEPCID) 20 mg, Oral, 2 times daily PRN    folic acid (Folvite) 1 MG tablet TAKE 1 TABLET BY MOUTH EVERY DAY    HYDROcodone-acetaminophen (Norco) 7.5-325 MG tablet No dose, route, or frequency recorded.    metoprolol succinate XL (Toprol-XL) 50 MG 24 hr tablet TAKE 1 TABLET BY MOUTH EVERY DAY. DO NOT CRUSH OR CHEW    Vitamins/Minerals tablet 1 tablet, Oral, Daily     Current Meds:     Current Facility-Administered Medications:     lidocaine (LIDODERM) 5 % 1 patch, 1 patch, Transdermal, Once, Jose Daniel Pichardo PA, 1 patch at 06/06/23 1727  No current outpatient medications on file.  Allergies:  No Known Allergies  Social History:   Social History     Tobacco Use    Smoking status: Not on file    Smokeless tobacco: Not on file   Substance Use Topics    Alcohol use: Not on file      Family History:  No family history on file.       Review of Systems  See history of present illness and past medical history.     Remainder of ROS is negative.      Vitals:   /89   Pulse 94   Temp 97.1 °F (36.2 °C)   Resp 18   Ht 160 cm (63\")   Wt 77.1 kg (170 lb)   SpO2 94%   BMI 30.11 kg/m²   I/O: No intake or output data in the 24 hours ending 06/06/23 1928  Exam:  Patient is examined using the personal protective equipment as per guidelines from infection control for this particular patient as enacted.  Hand washing was performed " before and after patient interaction.  General Appearance:    Alert, cooperative, no distress, appears stated age   Head:    Normocephalic, without obvious abnormality, atraumatic   Eyes:    PERRL, conjunctiva/corneas clear, EOM's intact, both eyes   Ears:    Normal external ear canals, both ears   Nose:   Nares normal, septum midline, mucosa normal, no drainage    or sinus tenderness   Throat:   Lips, tongue, gums normal; oral mucosa pink and moist   Neck: No adenopathy noted.   Back:   Mid spine tenderness with point tenderness in the mid scapular area.   Lungs:     Clear to auscultation bilaterally, respirations unlabored   Chest Wall:    No tenderness or deformity    Heart:  S1-S2 regular pleasant   Abdomen:     Soft, non-tender, bowel sounds active all four quadrants,     no masses, no hepatomegaly, no splenomegaly   Extremities: Superficial bruising on bilateral knees with preserved range of motion and no joint line tenderness noted.  Patient is able to ambulate.  Obese soft nontender   Pulses:   Pulses palpable in all extremities; symmetric all extremities   Skin: Bruising noted.   Neurologic: Grossly nonfocal examination       Data Review:      I reviewed the patient's new clinical results.  No radiology results for the last 30 days.  CONCLUSION:   1. Compression deformity T5 vertebrae.   2. No rib or sternal fracture seen.   3. No acute intrathoracic abnormality.   Assessment:  Active Hospital Problems    Diagnosis  POA    **Closed fracture of fifth thoracic vertebra, unspecified fracture morphology, initial encounter [S22.384W]  Yes    Fall [W19.XXXA]  Yes   History of recurrent falls  History of cognitive decline  Osteoporosis on bisphosphonates  Hypertension  Gastritis  Medical decision making/care plan: See admitting orders  Closed fracture of the fifth vertebrae with no associated neurological symptoms-plan is to admit the patient control her pain neurosurgery consultation.  Provided with fall  precautions.  Keep n.p.o. until evaluated by neurosurgery service but allow her medications and ice chips.  Continue with pulse ox monitoring while receiving pain medications.  History of cognitive decline on follow-up with neurology service-plan is to monitor for sundowning and provide her with fall precautions.  Osteoporosis-continue her home regimen but hold bisphosphonates.  Hypertension-continue antihypertensive regimen and avoid hypotensive episodes.  History of gastritis with history of gastric bypass surgery-continue her Protonix.    Shelby Sales MD   6/6/2023  19:28 EDT    Parts of this note may be an electronic transcription/translation of spoken language to printed text using the Dragon dictation system.

## 2023-06-07 ENCOUNTER — APPOINTMENT (OUTPATIENT)
Dept: MRI IMAGING | Facility: HOSPITAL | Age: 68
DRG: 544 | End: 2023-06-07
Payer: MEDICARE

## 2023-06-07 PROCEDURE — 99221 1ST HOSP IP/OBS SF/LOW 40: CPT | Performed by: NURSE PRACTITIONER

## 2023-06-07 PROCEDURE — 72146 MRI CHEST SPINE W/O DYE: CPT

## 2023-06-07 PROCEDURE — 97162 PT EVAL MOD COMPLEX 30 MIN: CPT

## 2023-06-07 RX ORDER — HYDROCODONE BITARTRATE AND ACETAMINOPHEN 7.5; 325 MG/1; MG/1
1 TABLET ORAL EVERY 6 HOURS PRN
Status: DISCONTINUED | OUTPATIENT
Start: 2023-06-07 | End: 2023-06-09 | Stop reason: HOSPADM

## 2023-06-07 RX ORDER — ATORVASTATIN CALCIUM 20 MG/1
40 TABLET, FILM COATED ORAL DAILY
Status: DISCONTINUED | OUTPATIENT
Start: 2023-06-07 | End: 2023-06-09 | Stop reason: HOSPADM

## 2023-06-07 RX ORDER — DIAZEPAM 5 MG/1
5 TABLET ORAL ONCE
Status: COMPLETED | OUTPATIENT
Start: 2023-06-07 | End: 2023-06-07

## 2023-06-07 RX ORDER — METOPROLOL SUCCINATE 50 MG/1
50 TABLET, EXTENDED RELEASE ORAL DAILY
Status: DISCONTINUED | OUTPATIENT
Start: 2023-06-07 | End: 2023-06-09 | Stop reason: HOSPADM

## 2023-06-07 RX ORDER — AMITRIPTYLINE HYDROCHLORIDE 50 MG/1
100 TABLET, FILM COATED ORAL NIGHTLY
Status: DISCONTINUED | OUTPATIENT
Start: 2023-06-07 | End: 2023-06-09 | Stop reason: HOSPADM

## 2023-06-07 RX ORDER — BUPROPION HYDROCHLORIDE 150 MG/1
150 TABLET ORAL DAILY
Status: DISCONTINUED | OUTPATIENT
Start: 2023-06-07 | End: 2023-06-09 | Stop reason: HOSPADM

## 2023-06-07 RX ORDER — FOLIC ACID 1 MG/1
1 TABLET ORAL DAILY
Status: DISCONTINUED | OUTPATIENT
Start: 2023-06-07 | End: 2023-06-09 | Stop reason: HOSPADM

## 2023-06-07 RX ORDER — BUSPIRONE HYDROCHLORIDE 10 MG/1
10 TABLET ORAL 2 TIMES DAILY
Status: DISCONTINUED | OUTPATIENT
Start: 2023-06-07 | End: 2023-06-09 | Stop reason: HOSPADM

## 2023-06-07 RX ORDER — CITALOPRAM 20 MG/1
20 TABLET ORAL DAILY
Status: DISCONTINUED | OUTPATIENT
Start: 2023-06-07 | End: 2023-06-09 | Stop reason: HOSPADM

## 2023-06-07 RX ORDER — PANTOPRAZOLE SODIUM 40 MG/1
40 TABLET, DELAYED RELEASE ORAL
Status: DISCONTINUED | OUTPATIENT
Start: 2023-06-07 | End: 2023-06-09 | Stop reason: HOSPADM

## 2023-06-07 RX ADMIN — DIAZEPAM 5 MG: 5 TABLET ORAL at 19:47

## 2023-06-07 RX ADMIN — PANTOPRAZOLE SODIUM 40 MG: 40 TABLET, DELAYED RELEASE ORAL at 17:50

## 2023-06-07 RX ADMIN — METOPROLOL SUCCINATE 50 MG: 50 TABLET, FILM COATED, EXTENDED RELEASE ORAL at 09:44

## 2023-06-07 RX ADMIN — Medication 10 ML: at 02:16

## 2023-06-07 RX ADMIN — ATORVASTATIN CALCIUM 40 MG: 20 TABLET, FILM COATED ORAL at 09:44

## 2023-06-07 RX ADMIN — Medication 10 ML: at 20:45

## 2023-06-07 RX ADMIN — BUSPIRONE HYDROCHLORIDE 10 MG: 10 TABLET ORAL at 09:43

## 2023-06-07 RX ADMIN — DOCUSATE SODIUM 50MG AND SENNOSIDES 8.6MG 2 TABLET: 8.6; 5 TABLET, FILM COATED ORAL at 02:16

## 2023-06-07 RX ADMIN — Medication 10 ML: at 09:44

## 2023-06-07 RX ADMIN — AMITRIPTYLINE HYDROCHLORIDE 100 MG: 50 TABLET, FILM COATED ORAL at 20:45

## 2023-06-07 RX ADMIN — HYDROCODONE BITARTRATE AND ACETAMINOPHEN 1 TABLET: 5; 325 TABLET ORAL at 14:25

## 2023-06-07 RX ADMIN — CITALOPRAM 20 MG: 20 TABLET, FILM COATED ORAL at 09:44

## 2023-06-07 RX ADMIN — Medication 1 MG: at 09:44

## 2023-06-07 RX ADMIN — BUSPIRONE HYDROCHLORIDE 10 MG: 10 TABLET ORAL at 20:45

## 2023-06-07 RX ADMIN — DOCUSATE SODIUM 50MG AND SENNOSIDES 8.6MG 2 TABLET: 8.6; 5 TABLET, FILM COATED ORAL at 20:45

## 2023-06-07 RX ADMIN — AMITRIPTYLINE HYDROCHLORIDE 100 MG: 50 TABLET, FILM COATED ORAL at 02:16

## 2023-06-07 RX ADMIN — HYDROCODONE BITARTRATE AND ACETAMINOPHEN 1 TABLET: 7.5; 325 TABLET ORAL at 17:49

## 2023-06-07 RX ADMIN — BUSPIRONE HYDROCHLORIDE 10 MG: 10 TABLET ORAL at 02:16

## 2023-06-07 RX ADMIN — DOCUSATE SODIUM 50MG AND SENNOSIDES 8.6MG 2 TABLET: 8.6; 5 TABLET, FILM COATED ORAL at 09:44

## 2023-06-07 RX ADMIN — HYDROCODONE BITARTRATE AND ACETAMINOPHEN 1 TABLET: 5; 325 TABLET ORAL at 02:20

## 2023-06-07 RX ADMIN — BUPROPION HYDROCHLORIDE 150 MG: 150 TABLET, EXTENDED RELEASE ORAL at 09:44

## 2023-06-07 RX ADMIN — HYDROCODONE BITARTRATE AND ACETAMINOPHEN 1 TABLET: 7.5; 325 TABLET ORAL at 23:58

## 2023-06-07 RX ADMIN — PANTOPRAZOLE SODIUM 40 MG: 40 TABLET, DELAYED RELEASE ORAL at 07:05

## 2023-06-07 NOTE — PLAN OF CARE
Goal Outcome Evaluation:              Outcome Evaluation: Alert and oriented. Undecided about surgery when neurosurgery made rounds this am, but wanted to discuss the option of surgery again with neurosurgery. Complaints of back pain x1, medicated with prn meds. Tolerating diet. Awaiting MRI. Family at bedside.

## 2023-06-07 NOTE — CONSULTS
"Nutrition Services    Patient Name:  Meghan Adrian  YOB: 1955  MRN: [id number]  Admit Date:  6/6/2023    Assessment Date:  06/07/23    Comment: Visited patient at bedside who reported she was hungry. She was still NPO at the time but is now on regular diet. She reports good appetite PTA. Patient has hx of gastritis and denies any issues since she has a hx of gastric bypass and reports no issues from that either. Patient has lost 12# (6%) x 7 months. She denies need for ONS. Will continue to follow       CLINICAL NUTRITION ASSESSMENT      Reason for Assessment Nurse Admission Screen     Diagnosis/Problem   Closed fracture of fifth thoracic vertebra    Medical/Surgical History History reviewed. No pertinent past medical history.    History reviewed. No pertinent surgical history.     Encounter Information        Nutrition History:     Food Preferences:    Supplements:    Factors Affecting Intake: No factors at this time     Anthropometrics        Current Height  Current Weight  BMI kg/m2 Height: 160 cm (63\")  Weight: 80.7 kg (178 lb) (06/06/23 2137)  Body mass index is 31.53 kg/m².   Adjusted BMI (if applicable)    BMI Category Obese, Class I (30 - 34.9)       Admission Weight 178# (80.7 kg)        Ideal Body Weight (IBW) 115# (52.4 kg)    Adjusted IBW (if applicable)        Usual Body Weight (UBW)    Weight Change/Trend Loss, Amount/Timeframe:        Weight History Wt Readings from Last 30 Encounters:   06/06/23 2137 80.7 kg (178 lb)   06/06/23 1424 77.1 kg (170 lb)   04/20/23 1455 77.1 kg (170 lb)           --  Tests/Procedures        Tests/Procedures CT scan, Colonoscopy, EGD     Labs       Pertinent Labs    Results from last 7 days   Lab Units 06/06/23  2226   SODIUM mmol/L 142   POTASSIUM mmol/L 4.0   CHLORIDE mmol/L 110*   CO2 mmol/L 22.0   BUN mg/dL 17   CREATININE mg/dL 1.08*   CALCIUM mg/dL 8.9   BILIRUBIN mg/dL <0.2   ALK PHOS U/L 62   ALT (SGPT) U/L 12   AST (SGOT) U/L 17   GLUCOSE mg/dL 98 "     Results from last 7 days   Lab Units 06/06/23  2226   HEMOGLOBIN g/dL 10.5*   HEMATOCRIT % 32.6*   WBC 10*3/mm3 7.08   ALBUMIN g/dL 3.6     Results from last 7 days   Lab Units 06/06/23  2226   PLATELETS 10*3/mm3 248     No results found for: COVID19  No results found for: HGBA1C       Medications           Scheduled Medications amitriptyline, 100 mg, Oral, Nightly  atorvastatin, 40 mg, Oral, Daily  buPROPion XL, 150 mg, Oral, Daily  busPIRone, 10 mg, Oral, BID  citalopram, 20 mg, Oral, Daily  diazePAM, 5 mg, Oral, Once  folic acid, 1 mg, Oral, Daily  metoprolol succinate XL, 50 mg, Oral, Daily  pantoprazole, 40 mg, Oral, BID AC  senna-docusate sodium, 2 tablet, Oral, BID  sodium chloride, 10 mL, Intravenous, Q12H       Infusions     PRN Medications   senna-docusate sodium **AND** polyethylene glycol **AND** bisacodyl **AND** bisacodyl    HYDROcodone-acetaminophen    Morphine **AND** naloxone    ondansetron    sodium chloride    sodium chloride     Physical Findings          Physical Appearance alert, obese, oriented   Oral/Mouth Cavity dentures   Edema  no edema   Gastrointestinal last bowel movement:6/5   Skin  skin intact   Tubes/Drains/Lines none   NFPE Not indicated at this time   --  Current Nutrition Orders & Evaluation of Intake       Oral Nutrition     Food Allergies NKFA   Current PO Diet Diet: Regular/House Diet; Texture: Regular Texture (IDDSI 7); Fluid Consistency: Thin (IDDSI 0)   Supplement n/a   PO Evaluation     % PO Intake     # of Days Evaluated    --  PES STATEMENT / NUTRITION DIAGNOSIS      Nutrition Dx Problem  Problem: No Nutrition Diagnosis at this Time     --  NUTRITION INTERVENTION / PLAN OF CARE      Intervention Goal(s) Maintain nutrition status and No significant weight loss         RD Intervention/Action Supplement offered/refused, Follow Tx Progress, and Care plan reviewed         Prescription/Orders:       PO Diet       Supplements       Snacks       Enteral Nutrition        Parenteral Nutrition    New Prescription Ordered? No changes at this time   --      Monitor/Evaluation Per protocol   Discharge Plan/Needs No discharge needs identified at this time   Education Will instruct as appropriate   --    RD to follow per protocol.      Electronically signed by:  Gemma Valles RD  06/07/23 14:47 EDT

## 2023-06-07 NOTE — CONSULTS
[facility] NEUROSURGERY CONSULT NOTE    Patient name: Meghan Adrian  Referring Provider: FRED  Reason for Consultation: T5 compression fracture    Patient Care Team:  Provider, No Known as PCP - General    Chief complaint: Back pain    Subjective .     History of present illness:    Patient is a 68 y.o. female who presents to the ED after a mechanical fall last night.  Patient states her dog got out, as she went after it, she lost balance and fell face forward.  She had complaints of chest and rib pain and was instructed by her PCP to present to the ED.  Imaging revealed a T5 compression fracture and we were consulted for further recommendations.  She denies any associated leg pain, weakness, numbness, bowel or bladder issues.    Review of Systems  Review of Systems  See above    History  PAST MEDICAL HISTORY  History reviewed. No pertinent past medical history.    PAST SURGICAL HISTORY  History reviewed. No pertinent surgical history.    FAMILY HISTORY  History reviewed. No pertinent family history.    SOCIAL HISTORY  Social History     Tobacco Use    Smoking status: Former     Packs/day: 1.00     Years: 28.00     Pack years: 28.00     Types: Cigarettes     Passive exposure: Past    Smokeless tobacco: Never   Vaping Use    Vaping Use: Never used   Substance Use Topics    Alcohol use: Yes     Alcohol/week: 3.0 standard drinks     Types: 1 Cans of beer, 2 Drinks containing 0.5 oz of alcohol per week    Drug use: Never       Allergies:  Patient has no known allergies.    MEDICATIONS:  Medications Prior to Admission   Medication Sig Dispense Refill Last Dose    alendronate (FOSAMAX) 35 MG tablet Take 1 tablet by mouth Every 7 (Seven) Days.   6/6/2023 at 1000    amitriptyline (ELAVIL) 100 MG tablet Take 1 tablet by mouth Every Night.   6/5/2023 at 2200    atorvastatin (LIPITOR) 40 MG tablet Take 1 tablet by mouth Daily.   6/5/2023 at 2200    buPROPion XL (WELLBUTRIN XL) 300 MG 24 hr tablet Take 1 tablet by mouth Daily.    6/6/2023 at 1000    busPIRone (BUSPAR) 10 MG tablet Take 1 tablet by mouth 2 (Two) Times a Day.   6/6/2023 at 1000    cephalexin (KEFLEX) 250 MG capsule Take 1 capsule by mouth Every Night.   6/6/2023 at 1000    cetirizine (zyrTEC) 10 MG tablet Take 1 tablet by mouth Daily.   6/6/2023 at 1000    citalopram (CeleXA) 40 MG tablet Take 1 tablet by mouth Daily.   6/6/2023 at 1000    Cyanocobalamin 1000 MCG/ML kit Inject 1,000 mcg as directed Every 30 (Thirty) Days.   5/23/2023    dexlansoprazole (DEXILANT) 60 MG capsule Take 1 capsule by mouth Daily.   6/6/2023 at 1000    famotidine (PEPCID) 20 MG tablet Take 1 tablet by mouth 2 (Two) Times a Day As Needed for Heartburn.   Past Week    fluconazole (DIFLUCAN) 100 MG tablet Take 1 tablet by mouth Daily.   6/6/2023 at 1000    folic acid (FOLVITE) 1 MG tablet Take 1 tablet by mouth Daily.   6/6/2023 at 1000    HYDROcodone-acetaminophen (NORCO) 7.5-325 MG per tablet Take 1 tablet by mouth Every 6 (Six) Hours As Needed for Moderate Pain.   6/5/2023    metoprolol succinate XL (TOPROL-XL) 50 MG 24 hr tablet Take 1 tablet by mouth Daily.   6/6/2023 at 1000    vitamin D (ERGOCALCIFEROL) 1.25 MG (20963 UT) capsule capsule Take 1 capsule by mouth 1 (One) Time Per Week.   Past Week       Objective     Results Review:  LABS:    Admission on 06/06/2023   Component Date Value Ref Range Status    WBC 06/06/2023 7.08  3.40 - 10.80 10*3/mm3 Final    RBC 06/06/2023 3.88  3.77 - 5.28 10*6/mm3 Final    Hemoglobin 06/06/2023 10.5 (L)  12.0 - 15.9 g/dL Final    Hematocrit 06/06/2023 32.6 (L)  34.0 - 46.6 % Final    MCV 06/06/2023 84.0  79.0 - 97.0 fL Final    MCH 06/06/2023 27.1  26.6 - 33.0 pg Final    MCHC 06/06/2023 32.2  31.5 - 35.7 g/dL Final    RDW 06/06/2023 14.6  12.3 - 15.4 % Final    RDW-SD 06/06/2023 44.5  37.0 - 54.0 fl Final    MPV 06/06/2023 10.0  6.0 - 12.0 fL Final    Platelets 06/06/2023 248  140 - 450 10*3/mm3 Final    Neutrophil % 06/06/2023 48.3  42.7 - 76.0 % Final     Lymphocyte % 06/06/2023 37.3  19.6 - 45.3 % Final    Monocyte % 06/06/2023 9.5  5.0 - 12.0 % Final    Eosinophil % 06/06/2023 4.0  0.3 - 6.2 % Final    Basophil % 06/06/2023 0.6  0.0 - 1.5 % Final    Immature Grans % 06/06/2023 0.3  0.0 - 0.5 % Final    Neutrophils, Absolute 06/06/2023 3.43  1.70 - 7.00 10*3/mm3 Final    Lymphocytes, Absolute 06/06/2023 2.64  0.70 - 3.10 10*3/mm3 Final    Monocytes, Absolute 06/06/2023 0.67  0.10 - 0.90 10*3/mm3 Final    Eosinophils, Absolute 06/06/2023 0.28  0.00 - 0.40 10*3/mm3 Final    Basophils, Absolute 06/06/2023 0.04  0.00 - 0.20 10*3/mm3 Final    Immature Grans, Absolute 06/06/2023 0.02  0.00 - 0.05 10*3/mm3 Final    nRBC 06/06/2023 0.0  0.0 - 0.2 /100 WBC Final    Glucose 06/06/2023 98  65 - 99 mg/dL Final    BUN 06/06/2023 17  8 - 23 mg/dL Final    Creatinine 06/06/2023 1.08 (H)  0.57 - 1.00 mg/dL Final    Sodium 06/06/2023 142  136 - 145 mmol/L Final    Potassium 06/06/2023 4.0  3.5 - 5.2 mmol/L Final    Chloride 06/06/2023 110 (H)  98 - 107 mmol/L Final    CO2 06/06/2023 22.0  22.0 - 29.0 mmol/L Final    Calcium 06/06/2023 8.9  8.6 - 10.5 mg/dL Final    Total Protein 06/06/2023 6.2  6.0 - 8.5 g/dL Final    Albumin 06/06/2023 3.6  3.5 - 5.2 g/dL Final    ALT (SGPT) 06/06/2023 12  1 - 33 U/L Final    AST (SGOT) 06/06/2023 17  1 - 32 U/L Final    Alkaline Phosphatase 06/06/2023 62  39 - 117 U/L Final    Total Bilirubin 06/06/2023 <0.2  0.0 - 1.2 mg/dL Final    Globulin 06/06/2023 2.6  gm/dL Final    A/G Ratio 06/06/2023 1.4  g/dL Final    BUN/Creatinine Ratio 06/06/2023 15.7  7.0 - 25.0 Final    Anion Gap 06/06/2023 10.0  5.0 - 15.0 mmol/L Final    eGFR 06/06/2023 55.7 (L)  >60.0 mL/min/1.73 Final       DIAGNOSTICS:    CT test: Compression deformity T5 with 50% volume loss    Results Review:   I reviewed the patient's new clinical results.  I personally viewed and interpreted the patient's chart    Vital Signs   Temp:  [97.1 °F (36.2 °C)-97.8 °F (36.6 °C)] 97.8 °F (36.6  "°C)  Heart Rate:  [65-96] 66  Resp:  [18-20] 20  BP: (116-148)/(69-89) 116/69    Physical Exam:  Physical Exam  Neurologic Exam    Assessment & Plan       Closed fracture of fifth thoracic vertebra, unspecified fracture morphology, initial encounter    Fall    This is a 68-year-old female who had a ground-level fall, found to have a T5 compression fracture.    PLAN:     She is deciding on kyphoplasty versus conservative measures.  In the meantime, check MRI.  Will order Valium for presedation.  Pain control  Okay for out of bed and to work with physical therapy    I discussed the patient's findings and my recommendations with patient, family, and nursing staff    During patient visit, I utilized appropriate personal protective equipment including mask and gloves.  Mask used was standard procedure mask. Appropriate PPE was worn during the entire visit.  Hand hygiene was completed before and after.     Susan Hirsch, APRN  06/07/23  10:53 EDT    \"Dictated utilizing Dragon dictation\".      "

## 2023-06-07 NOTE — PROGRESS NOTES
Discharge Planning Assessment  [facility]     Patient Name: Meghan Adrian  MRN: [id number]  Today's Date: 6/7/2023    Admit Date: 6/6/2023    Plan: Home with family, follow for needs   Discharge Needs Assessment       Row Name 06/07/23 1603       Living Environment    People in Home child(adele), adult;grandparent(s)    Name(s) of People in Home daughter and grandson    Current Living Arrangements home    Primary Care Provided by self    Provides Primary Care For no one    Family Caregiver if Needed child(adele), adult    Quality of Family Relationships helpful;involved;supportive    Able to Return to Prior Arrangements yes       Resource/Environmental Concerns    Resource/Environmental Concerns none       Transition Planning    Patient/Family Anticipates Transition to home with family    Patient/Family Anticipated Services at Transition none    Transportation Anticipated family or friend will provide       Discharge Needs Assessment    Equipment Currently Used at Home none    Concerns to be Addressed no discharge needs identified;denies needs/concerns at this time    Discharge Coordination/Progress Pending                   Discharge Plan       Row Name 06/07/23 1604       Plan    Plan Home with family, follow for needs    Patient/Family in Agreement with Plan yes    Plan Comments CCP met with pt to discuss d/c planning. Pt resides with her daughter and grandson in a three level home in which she accesses no steps, uses no DME, and has no h/o HH/SNF. Pt prefers Meds to Beds (updated). Plan noted for surgery per [facility] Friday, 6/9. CCP to follow for needs pending clinical course. Aneta Gates LCSW                  Continued Care and Services - Admitted Since 6/6/2023    Coordination has not been started for this encounter.          Demographic Summary       Row Name 06/07/23 1604       General Information    Admission Type inpatient    Arrived From home    Required Notices Provided Important Message from Medicare     Referral Source admission list    Reason for Consult discharge planning    Preferred Language English                   Functional Status       Row Name 06/07/23 1602       Functional Status    Usual Activity Tolerance good    Current Activity Tolerance moderate       Functional Status, IADL    Medications independent    Meal Preparation independent    Housekeeping independent    Laundry independent    Shopping independent       Mental Status Summary    Recent Changes in Mental Status/Cognitive Functioning no changes                   Psychosocial    No documentation.                  Abuse/Neglect    No documentation.                  Legal    No documentation.                  Substance Abuse    No documentation.                  Patient Forms    No documentation.                     Milly Gates LCSW

## 2023-06-07 NOTE — PLAN OF CARE
Goal Outcome Evaluation:  Plan of Care Reviewed With: patient           Outcome Evaluation: Pt is 67 yo female admitted to [facility] after fall with resultant T5 compression fx. Patient initially undecided about surgery, now states she is going for kyphoplasty on Friday 6/9. Patient lives with daughter, independent at baseline. Patient performed bed mobility with supervision, sit to stand with SBA, ambulated short distance in room to bathroom and then back to bed, declined further ambulation due to pain which she rates at 4/10 level. Pt demonstrates impairments consisting of generalized weakness and pain, decreased activity tolerance and would benefit from skilled PT. Pt encouraged to mobilize with [facility] staff, up to bathroom, up to chair for meals at tolerated, PT will re-assess after kyphoplasty.

## 2023-06-07 NOTE — THERAPY EVALUATION
Patient Name: Meghan Adrian  : 1955    MRN: [id number]                              Today's Date: 2023       Admit Date: 2023    Visit Dx:     ICD-10-CM ICD-9-CM   1. Closed fracture of fifth thoracic vertebra, unspecified fracture morphology, initial encounter  S22.059A 805.2     Patient Active Problem List   Diagnosis    Closed fracture of fifth thoracic vertebra, unspecified fracture morphology, initial encounter    Fall     History reviewed. No pertinent past medical history.  History reviewed. No pertinent surgical history.   General Information       Row Name 23 1637          Physical Therapy Time and Intention    Document Type evaluation  -EM     Mode of Treatment individual therapy;physical therapy  -EM       Row Name 23 1637          General Information    Patient Profile Reviewed yes  -EM     Prior Level of Function independent:  -EM     Existing Precautions/Restrictions fall  -EM       Row Name 23 1637          Living Environment    People in Home child(adele), adult  -EM       Row Name 23 1637          Home Main Entrance    Number of Stairs, Main Entrance two  -EM       Row Name 23 1637          Stairs Within Home, Primary    Stairs, Within Home, Primary stays on main level  -EM       Row Name 23 1637          Cognition    Orientation Status (Cognition) oriented x 3  -EM       Row Name 23 1637          Safety Issues, Functional Mobility    Impairments Affecting Function (Mobility) strength;pain;endurance/activity tolerance  -EM               User Key  (r) = Recorded By, (t) = Taken By, (c) = Cosigned By      Initials Name Provider Type    EM Henrietta Mccullough PT Physical Therapist                   Mobility       Row Name 23 1638          Bed Mobility    Bed Mobility supine-sit;sit-supine  -EM     Supine-Sit Andrews (Bed Mobility) standby assist  -EM     Sit-Supine Andrews (Bed Mobility) standby assist  -EM     Assistive Device  (Bed Mobility) head of bed elevated  -EM       Row Name 06/07/23 1638          Sit-Stand Transfer    Sit-Stand [place] (Transfers) standby assist  -EM       Row Name 06/07/23 1638          Gait/Stairs (Locomotion)    [place] Level (Gait) contact guard  -EM     Distance in Feet (Gait) 30 (to bathroom)  -EM     Deviations/Abnormal Patterns (Gait) stride length decreased  -EM     Comment, (Gait/Stairs) reaching out for objecs to steady self on way to bathroom, has increased lumbar extension  -EM               User Key  (r) = Recorded By, (t) = Taken By, (c) = Cosigned By      Initials Name Provider Type    EM Henrietta Mccullough PT Physical Therapist                   Obj/Interventions       Row Name 06/07/23 1639          Range of Motion Comprehensive    General Range of Motion no range of motion deficits identified  -EM       Row Name 06/07/23 1639          Strength Comprehensive (MMT)    General Manual Muscle Testing (MMT) Assessment other (see comments)  -EM     Comment, General Manual Muscle Testing (MMT) Assessment no focal deficits identified  -EM       Row Name 06/07/23 1639          Balance    Balance Assessment sitting static balance;sitting dynamic balance;standing dynamic balance;standing static balance  -EM     Static Sitting Balance independent  -EM     Dynamic Sitting Balance supervision  -EM     Position, Sitting Balance sitting edge of bed  -EM     Static Standing Balance standby assist  -EM     Dynamic Standing Balance contact guard  -EM       Row Name 06/07/23 1639          Sensory Assessment (Somatosensory)    Sensory Assessment (Somatosensory) sensation intact  -EM               User Key  (r) = Recorded By, (t) = Taken By, (c) = Cosigned By      Initials Name Provider Type    EM Henrietta Mccullough PT Physical Therapist                   Goals/Plan       Row Name 06/07/23 1643          Bed Mobility Goal 1 (PT)    Activity/Assistive Device (Bed Mobility Goal 1, PT) bed mobility  activities, all  -EM     [place] Level/Cues Needed (Bed Mobility Goal 1, PT) modified independence  -EM     Time Frame (Bed Mobility Goal 1, PT) 1 week  -EM       Row Name 06/07/23 1643          Transfer Goal 1 (PT)    Activity/Assistive Device (Transfer Goal 1, PT) transfers, all  -EM     [place] Level/Cues Needed (Transfer Goal 1, PT) supervision required  -EM     Time Frame (Transfer Goal 1, PT) 1 week  -EM       Row Name 06/07/23 1643          Gait Training Goal 1 (PT)    Activity/Assistive Device (Gait Training Goal 1, PT) gait (walking locomotion)  -EM     [place] Level (Gait Training Goal 1, PT) standby assist  -EM     Distance (Gait Training Goal 1, PT) 150  -EM     Time Frame (Gait Training Goal 1, PT) 1 week  -EM       Row Name 06/07/23 1643          Therapy Assessment/Plan (PT)    Planned Therapy Interventions (PT) bed mobility training;gait training;patient/family education;transfer training  -EM               User Key  (r) = Recorded By, (t) = Taken By, (c) = Cosigned By      Initials Name Provider Type    EM Henrietta Mccullough, PT Physical Therapist                   Clinical Impression       Row Name 06/07/23 1640          Pain    Pretreatment Pain Rating 0/10 - no pain  -EM     Posttreatment Pain Rating 4/10  -EM     Pain Location - back  -EM     Pain Intervention(s) Medication (See MAR);Repositioned  -EM       Row Name 06/07/23 1640          Plan of Care Review    Plan of Care Reviewed With patient  -EM     Outcome Evaluation Pt is 69 yo female admitted to [facility] after fall with resultant T5 compression fx. Patient initially undecided about surgery, now states she is going for kyphoplasty on Friday 6/9. Patient lives with daughter, independent at baseline. Patient performed bed mobility with supervision, sit to stand with SBA, ambulated short distance in room to bathroom and then back to bed, declined further ambulation due to pain which she rates at 4/10 level. Pt demonstrates  impairments consisting of generalized weakness and pain, decreased activity tolerance and would benefit from skilled PT. Pt encouraged to mobilize with nsg staff, up to bathroom, up to chair for meals at tolerated, PT will re-assess after kyphoplasty.  -EM       Row Name 06/07/23 1640          Therapy Assessment/Plan (PT)    Patient/Family Therapy Goals Statement (PT) go home  -EM     Rehab Potential (PT) good, to achieve stated therapy goals  -EM     Criteria for Skilled Interventions Met (PT) yes;skilled treatment is necessary  -EM     Therapy Frequency (PT) 3 times/wk  -EM       Row Name 06/07/23 1640          Positioning and Restraints    Pre-Treatment Position in bed  -EM     Post Treatment Position bed  -EM     In Bed supine;notified nsg;call light within reach;with family/caregiver  -EM               User Key  (r) = Recorded By, (t) = Taken By, (c) = Cosigned By      Initials Name Provider Type    Henrietta Noel, PT Physical Therapist                   Outcome Measures       Row Name 06/07/23 1643 06/07/23 0944       How much help from another person do you currently need...    Turning from your back to your side while in flat bed without using bedrails? 4  -EM 2  -TF    Moving from lying on back to sitting on the side of a flat bed without bedrails? 3  -EM 2  -TF    Moving to and from a bed to a chair (including a wheelchair)? 3  -EM 2  -TF    Standing up from a chair using your arms (e.g., wheelchair, bedside chair)? 3  -EM 2  -TF    Climbing 3-5 steps with a railing? 3  -EM 2  -TF    To walk in hospital room? 3  -EM 2  -TF    AM-PAC 6 Clicks Score (PT) 19  -EM 12  -TF    Highest level of mobility 6 --> Walked 10 steps or more  -EM 4 --> Transferred to chair/commode  -TF              User Key  (r) = Recorded By, (t) = Taken By, (c) = Cosigned By      Initials Name Provider Type    Felicia Ricardo, RN Registered Nurse    EM Henrietta Mccullough, PT Physical Therapist                                  Physical Therapy Education       Title: PT OT SLP Therapies (In Progress)       Topic: Physical Therapy (In Progress)       Point: Mobility training (Done)       Learning Progress Summary             Patient Acceptance, E, VU by EM at 6/7/2023 1644   Family Acceptance, E, VU by EM at 6/7/2023 1644                         Point: Home exercise program (Not Started)       Learner Progress:  Not documented in this visit.              Point: Body mechanics (Not Started)       Learner Progress:  Not documented in this visit.              Point: Precautions (Not Started)       Learner Progress:  Not documented in this visit.                              User Key       Initials Effective Dates Name Provider Type Discipline    EM 06/16/21 -  Henrietta Mccullough, PT Physical Therapist PT                  PT Recommendation and Plan  Planned Therapy Interventions (PT): bed mobility training, gait training, patient/family education, transfer training  Plan of Care Reviewed With: patient  Outcome Evaluation: Pt is 67 yo female admitted to [facility] after fall with resultant T5 compression fx. Patient initially undecided about surgery, now states she is going for kyphoplasty on Friday 6/9. Patient lives with daughter, independent at baseline. Patient performed bed mobility with supervision, sit to stand with SBA, ambulated short distance in room to bathroom and then back to bed, declined further ambulation due to pain which she rates at 4/10 level. Pt demonstrates impairments consisting of generalized weakness and pain, decreased activity tolerance and would benefit from skilled PT. Pt encouraged to mobilize with [facility] staff, up to bathroom, up to chair for meals at tolerated, PT will re-assess after kyphoplasty.     Time Calculation:    PT Charges       Row Name 06/07/23 1645             Time Calculation    Start Time 1620  -EM      Stop Time 1630  -EM      Time Calculation (min) 10 min  -EM      PT Received On 06/07/23  -EM       PT - Next Appointment 06/09/23  -EM      PT Goal Re-Cert Due Date 06/14/23  -EM                User Key  (r) = Recorded By, (t) = Taken By, (c) = Cosigned By      Initials Name Provider Type    EM Henrietta Mccullough, PT Physical Therapist                  Therapy Charges for Today       Code Description Service Date Service Provider Modifiers Qty    51773433610 HC PT EVAL MOD COMPLEXITY 2 6/7/2023 Henrietta Mccullough PT GP 1            PT G-Codes  AM-PAC 6 Clicks Score (PT): 19  PT Discharge Summary  Anticipated Discharge Disposition (PT): home with assist    Henrietta Mccullough, PT  6/7/2023

## 2023-06-08 PROBLEM — I10 HTN (HYPERTENSION): Status: ACTIVE | Noted: 2023-06-08

## 2023-06-08 PROBLEM — N18.31 STAGE 3A CHRONIC KIDNEY DISEASE (CKD): Status: ACTIVE | Noted: 2023-06-08

## 2023-06-08 PROCEDURE — 99231 SBSQ HOSP IP/OBS SF/LOW 25: CPT | Performed by: NURSE PRACTITIONER

## 2023-06-08 PROCEDURE — 25010000002 ONDANSETRON PER 1 MG: Performed by: INTERNAL MEDICINE

## 2023-06-08 RX ORDER — HYDROCODONE BITARTRATE AND ACETAMINOPHEN 7.5; 325 MG/1; MG/1
1 TABLET ORAL EVERY 6 HOURS PRN
Qty: 20 TABLET | Refills: 0 | Status: SHIPPED | OUTPATIENT
Start: 2023-06-08 | End: 2023-06-13

## 2023-06-08 RX ADMIN — BUSPIRONE HYDROCHLORIDE 10 MG: 10 TABLET ORAL at 20:54

## 2023-06-08 RX ADMIN — Medication 10 ML: at 09:23

## 2023-06-08 RX ADMIN — HYDROCODONE BITARTRATE AND ACETAMINOPHEN 1 TABLET: 7.5; 325 TABLET ORAL at 17:45

## 2023-06-08 RX ADMIN — BUPROPION HYDROCHLORIDE 150 MG: 150 TABLET, EXTENDED RELEASE ORAL at 09:22

## 2023-06-08 RX ADMIN — ATORVASTATIN CALCIUM 40 MG: 20 TABLET, FILM COATED ORAL at 09:22

## 2023-06-08 RX ADMIN — HYDROCODONE BITARTRATE AND ACETAMINOPHEN 1 TABLET: 7.5; 325 TABLET ORAL at 09:22

## 2023-06-08 RX ADMIN — Medication 10 ML: at 20:54

## 2023-06-08 RX ADMIN — ONDANSETRON 4 MG: 2 INJECTION INTRAMUSCULAR; INTRAVENOUS at 14:40

## 2023-06-08 RX ADMIN — METOPROLOL SUCCINATE 50 MG: 50 TABLET, FILM COATED, EXTENDED RELEASE ORAL at 09:22

## 2023-06-08 RX ADMIN — PANTOPRAZOLE SODIUM 40 MG: 40 TABLET, DELAYED RELEASE ORAL at 09:23

## 2023-06-08 RX ADMIN — Medication 1 MG: at 09:22

## 2023-06-08 RX ADMIN — PANTOPRAZOLE SODIUM 40 MG: 40 TABLET, DELAYED RELEASE ORAL at 17:45

## 2023-06-08 RX ADMIN — AMITRIPTYLINE HYDROCHLORIDE 100 MG: 50 TABLET, FILM COATED ORAL at 20:54

## 2023-06-08 RX ADMIN — CITALOPRAM 20 MG: 20 TABLET, FILM COATED ORAL at 09:22

## 2023-06-08 RX ADMIN — BUSPIRONE HYDROCHLORIDE 10 MG: 10 TABLET ORAL at 09:22

## 2023-06-08 RX ADMIN — DOCUSATE SODIUM 50MG AND SENNOSIDES 8.6MG 2 TABLET: 8.6; 5 TABLET, FILM COATED ORAL at 09:22

## 2023-06-08 NOTE — PROGRESS NOTES
" LOS: 2 days     Name: Meghan Adrian  Age: 68 y.o.  Sex: female  :  1955  MRN: [id number]         Primary Care Physician: Gala Pandey APRN    Subjective   Subjective  Patient states she has ambulated to the bathroom but this is quite painful.  Doing okay as long as she lays still in bed.    Objective   Vital Signs  Temp:  [97.7 °F (36.5 °C)-98.2 °F (36.8 °C)] 97.7 °F (36.5 °C)  Heart Rate:  [72-83] 74  Resp:  [18-20] 18  BP: (102-119)/(60-90) 119/84  Body mass index is 31.53 kg/m².    Objective:  General Appearance:  Comfortable and in no acute distress (Looks older than stated age).    Vital signs: (most recent): Blood pressure 119/84, pulse 74, temperature 97.7 °F (36.5 °C), temperature source Oral, resp. rate 18, height 160 cm (63\"), weight 80.7 kg (178 lb), SpO2 98 %.    Lungs:  Normal effort and normal respiratory rate.    Heart: Normal rate.  Regular rhythm.    Abdomen: Abdomen is soft.  Bowel sounds are normal.   There is no abdominal tenderness.     Extremities: There is no dependent edema or local swelling.    Neurological: Patient is alert and oriented to person, place and time.    Skin:  Warm and dry.              Results Review:       I reviewed the patient's new clinical results.    Results from last 7 days   Lab Units 23  2226   WBC 10*3/mm3 7.08   HEMOGLOBIN g/dL 10.5*   PLATELETS 10*3/mm3 248     Results from last 7 days   Lab Units 23  2226   SODIUM mmol/L 142   POTASSIUM mmol/L 4.0   CHLORIDE mmol/L 110*   CO2 mmol/L 22.0   BUN mg/dL 17   CREATININE mg/dL 1.08*   CALCIUM mg/dL 8.9   GLUCOSE mg/dL 98                 Scheduled Meds:   amitriptyline, 100 mg, Oral, Nightly  atorvastatin, 40 mg, Oral, Daily  buPROPion XL, 150 mg, Oral, Daily  busPIRone, 10 mg, Oral, BID  citalopram, 20 mg, Oral, Daily  folic acid, 1 mg, Oral, Daily  metoprolol succinate XL, 50 mg, Oral, Daily  pantoprazole, 40 mg, Oral, BID AC  senna-docusate sodium, 2 tablet, Oral, BID  sodium " chloride, 10 mL, Intravenous, Q12H      PRN Meds:     senna-docusate sodium **AND** polyethylene glycol **AND** bisacodyl **AND** bisacodyl    HYDROcodone-acetaminophen    Morphine **AND** naloxone    ondansetron    sodium chloride    sodium chloride  Continuous Infusions:       Assessment & Plan   Active Hospital Problems    Diagnosis  POA    **Closed fracture of fifth thoracic vertebra, unspecified fracture morphology, initial encounter [S22.399P]  Yes    HTN (hypertension) [I10]  Unknown    Stage 3a chronic kidney disease (CKD) [N18.31]  Unknown    Fall [W19.XXXA]  Yes      Resolved Hospital Problems   No resolved problems to display.       Assessment & Plan    -MRI of the thoracic spine shows chronic appearing T5 compression fracture.  Appreciate input from neurosurgery.  -PT/OT along with pain control measures  -Blood pressure stable on present regimen    Dispo  To be determined pending therapy and input from neurosurgery    Expected Discharge Date: 6/10/2023; Expected Discharge Time:      Haile Merrill MD  [facility]  06/08/23  09:53 EDT

## 2023-06-08 NOTE — PLAN OF CARE
Goal Outcome Evaluation:              Outcome Evaluation: Alert and oriented. Complaints of back pain, medicated with prn meds. C/o nausea and dizziness right after eating, medicated with prn zofran and symptoms resolved and have not returned. Up to ambulate to the BR with assistance. Back brace at bedside.

## 2023-06-08 NOTE — PROGRESS NOTES
[facility] THORACIC/LUMBAR NEUROSURGERY PROGRESS NOTE      CC:chronic T5 compression fracture       Subjective     Interval History: no new complaints or events overnight, pain has been controlled on current regimen.    ROS:  Constitutional: No fever, chills  MS: intermittent back pain  Neuro: No numbness, tingling, or weakness,  no balance difficulties  : No difficulty voiding, no incontinence    Objective     Vital signs in last 24 hours:  Temp:  [97.7 °F (36.5 °C)-98.2 °F (36.8 °C)] 97.7 °F (36.5 °C)  Heart Rate:  [72-83] 74  Resp:  [18-20] 18  BP: (102-119)/(60-90) 119/84    Intake/Output this shift:  No intake/output data recorded.    LABS:  Results from last 7 days   Lab Units 06/06/23  2226   WBC 10*3/mm3 7.08   HEMOGLOBIN g/dL 10.5*   HEMATOCRIT % 32.6*   PLATELETS 10*3/mm3 248       Results from last 7 days   Lab Units 06/06/23  2226   SODIUM mmol/L 142   POTASSIUM mmol/L 4.0   CHLORIDE mmol/L 110*   CO2 mmol/L 22.0   BUN mg/dL 17   CREATININE mg/dL 1.08*   GLUCOSE mg/dL 98   CALCIUM mg/dL 8.9       IMAGING STUDIES:    MRI thoracic spine:    1.  T5 compression fracture is chronic.   2.  No marrow edema or acute compression deformity.   3.  No disc herniation, abnormal cord signal or central spinal stenosis.       I personally viewed and interpreted the patient's chart.    Meds reviewed/changed: Yes    Physical exam  Awake, alert, oriented x3  Pupils equal round reactive to light  Extraocular muscles intact  Face symmetric  Speech is fluent and clear  No pronator drift  Motor exam  Bilateral deltoids 5/5, bilateral biceps 5/5, bilateral triceps 5/5, bilateral wrist extension 5/5 bilateral hand  5/5  Bilateral hip flexion 5/5, bilateral knee extension 5/5, bilateral DF/PF 5/5  gait deferred  Able to detect  light touch in all 4 extremities        Assessment & Plan     ASSESSMENT:      Closed fracture of fifth thoracic vertebra, unspecified fracture morphology, initial encounter    Fall    HTN  "(hypertension)    Stage 3a chronic kidney disease (CKD)      This is a 69yo female who initially presented to the ED after a mechanical fall. CT imaging revealed a T5 compression fracture. Further imaging with MRI demonstrated T5 compression fracture was chronic.    PLAN:     the compression fracture is chronic and is therefore nonsurgical.  We will order a brace to be worn out of bed and recommend physical therapy and pain control. She does not need to follow up with us. we will sign off at this time but be available as needed.     I discussed the patient's findings and my recommendations with patient, family, and nursing staff    Appropriate PPE was worn during the entire visit.  Hand hygiene was completed before and after.      LOS: 2 days       Susan Hirsch, APRN  6/8/2023  11:02 EDT    \"Dictated utilizing Dragon dictation\".      "

## 2023-06-08 NOTE — PLAN OF CARE
Goal Outcome Evaluation:      VSS. Patient oriented x4. Pain managed per MAR. Patient up with assist x1, posterior leaning noted with ambulation. No other significant events overnight. Patient slept in between care. Family at bedside.             Problem: Adult Inpatient Plan of Care  Goal: Plan of Care Review  Outcome: Ongoing, Progressing  Goal: Patient-Specific Goal (Individualized)  Outcome: Ongoing, Progressing  Goal: Absence of Hospital-Acquired Illness or Injury  Outcome: Ongoing, Progressing  Intervention: Identify and Manage Fall Risk  Recent Flowsheet Documentation  Taken 6/8/2023 0421 by Bia Campbell RN  Safety Promotion/Fall Prevention: safety round/check completed  Taken 6/8/2023 0200 by Bia Campbell RN  Safety Promotion/Fall Prevention: safety round/check completed  Taken 6/8/2023 0000 by Bia Campbell RN  Safety Promotion/Fall Prevention: safety round/check completed  Taken 6/7/2023 2200 by Bia Campbell RN  Safety Promotion/Fall Prevention: safety round/check completed  Taken 6/7/2023 2045 by Bia Campbell RN  Safety Promotion/Fall Prevention:   activity supervised   assistive device/personal items within reach   clutter free environment maintained   fall prevention program maintained   nonskid shoes/slippers when out of bed   safety round/check completed  Intervention: Prevent Skin Injury  Recent Flowsheet Documentation  Taken 6/8/2023 0421 by Bia Campbell RN  Body Position: position changed independently  Taken 6/8/2023 0200 by Bia Campbell RN  Body Position: position changed independently  Taken 6/8/2023 0000 by Bia Campbell RN  Body Position: position changed independently  Taken 6/7/2023 2200 by Bia Campbell RN  Body Position: position changed independently  Taken 6/7/2023 2045 by Bia Cmapbell RN  Body Position: position changed independently  Intervention: Prevent and Manage VTE (Venous Thromboembolism) Risk  Recent Flowsheet Documentation  Taken 6/7/2023  2045 by Bia Campbell RN  Activity Management: back to bed  VTE Prevention/Management:   bilateral   sequential compression devices on  Goal: Optimal Comfort and Wellbeing  Outcome: Ongoing, Progressing  Intervention: Provide Person-Centered Care  Recent Flowsheet Documentation  Taken 6/7/2023 2045 by Bia Campbell RN  Trust Relationship/Rapport:   care explained   questions answered   questions encouraged  Goal: Readiness for Transition of Care  Outcome: Ongoing, Progressing     Problem: Hypertension Comorbidity  Goal: Blood Pressure in Desired Range  Outcome: Ongoing, Progressing  Intervention: Maintain Blood Pressure Management  Recent Flowsheet Documentation  Taken 6/7/2023 2045 by Bia Campbell RN  Medication Review/Management: medications reviewed     Problem: Fall Injury Risk  Goal: Absence of Fall and Fall-Related Injury  Outcome: Ongoing, Progressing  Intervention: Identify and Manage Contributors  Recent Flowsheet Documentation  Taken 6/7/2023 2045 by Bia Campbell RN  Medication Review/Management: medications reviewed  Intervention: Promote Injury-Free Environment  Recent Flowsheet Documentation  Taken 6/8/2023 0421 by Bia Campbell RN  Safety Promotion/Fall Prevention: safety round/check completed  Taken 6/8/2023 0200 by Bia Campbell RN  Safety Promotion/Fall Prevention: safety round/check completed  Taken 6/8/2023 0000 by Bia Campbell RN  Safety Promotion/Fall Prevention: safety round/check completed  Taken 6/7/2023 2200 by Bia Campbell RN  Safety Promotion/Fall Prevention: safety round/check completed  Taken 6/7/2023 2045 by Bia Campbell RN  Safety Promotion/Fall Prevention:   activity supervised   assistive device/personal items within reach   clutter free environment maintained   fall prevention program maintained   nonskid shoes/slippers when out of bed   safety round/check completed     Problem: Pain Acute  Goal: Acceptable Pain Control and Functional  Ability  Outcome: Ongoing, Progressing  Intervention: Prevent or Manage Pain  Recent Flowsheet Documentation  Taken 6/7/2023 2045 by Bia Campbell RN  Medication Review/Management: medications reviewed

## 2023-06-08 NOTE — PROGRESS NOTES
"DAILY PROGRESS NOTE  [facility]    Patient Identification:  Name: Meghan Adrian  Age: 68 y.o.  Sex: female  :  1955  MRN: [id number]         Primary Care Physician: Gala Pandey APRN      Subjective  \"As long as I do not move I feel okay\".    Objective:  General Appearance:  Comfortable, well-appearing, in no acute distress and not in pain.    Vital signs: (most recent): Blood pressure 102/66, pulse 82, temperature 98.2 °F (36.8 °C), temperature source Oral, resp. rate 20, height 160 cm (63\"), weight 80.7 kg (178 lb), SpO2 95 %.    Lungs:  Normal effort and normal respiratory rate.  Breath sounds clear to auscultation.    Heart: Normal rate.  Regular rhythm.    Extremities: There is no dependent edema.    Neurological: Patient is alert and oriented to person, place and time.    Skin:  Warm and dry.                Vital signs in last 24 hours:  Temp:  [97.6 °F (36.4 °C)-98.2 °F (36.8 °C)] 98.2 °F (36.8 °C)  Heart Rate:  [65-96] 82  Resp:  [18-20] 20  BP: (102-137)/(66-90) 102/66    Intake/Output:  No intake or output data in the 24 hours ending 23 2120      Results from last 7 days   Lab Units 23  2226   WBC 10*3/mm3 7.08   HEMOGLOBIN g/dL 10.5*   PLATELETS 10*3/mm3 248     Results from last 7 days   Lab Units 23  2226   SODIUM mmol/L 142   POTASSIUM mmol/L 4.0   CHLORIDE mmol/L 110*   CO2 mmol/L 22.0   BUN mg/dL 17   CREATININE mg/dL 1.08*   GLUCOSE mg/dL 98   Estimated Creatinine Clearance: 49.4 mL/min (A) (by C-G formula based on SCr of 1.08 mg/dL (H)).  Results from last 7 days   Lab Units 23  2226   CALCIUM mg/dL 8.9   ALBUMIN g/dL 3.6     Results from last 7 days   Lab Units 23  2226   ALBUMIN g/dL 3.6   BILIRUBIN mg/dL <0.2   ALK PHOS U/L 62   AST (SGOT) U/L 17   ALT (SGPT) U/L 12       Assessment:    Closed fracture of fifth thoracic vertebra, unspecified fracture morphology, initial encounter    Fall  Hypertension:  CKD III    All " problems new to this examiner.    Plan:  Please see above.    Aramis Baugh MD  6/7/2023  21:20 EDT

## 2023-06-09 ENCOUNTER — READMISSION MANAGEMENT (OUTPATIENT)
Dept: CALL CENTER | Facility: HOSPITAL | Age: 68
End: 2023-06-09
Payer: MEDICARE

## 2023-06-09 ENCOUNTER — DOCUMENTATION (OUTPATIENT)
Dept: SOCIAL WORK | Facility: HOSPITAL | Age: 68
End: 2023-06-09
Payer: MEDICARE

## 2023-06-09 VITALS
DIASTOLIC BLOOD PRESSURE: 80 MMHG | OXYGEN SATURATION: 95 % | BODY MASS INDEX: 31.54 KG/M2 | HEIGHT: 63 IN | WEIGHT: 178 LBS | HEART RATE: 73 BPM | RESPIRATION RATE: 18 BRPM | SYSTOLIC BLOOD PRESSURE: 109 MMHG | TEMPERATURE: 97.9 F

## 2023-06-09 RX ADMIN — Medication 10 ML: at 08:22

## 2023-06-09 RX ADMIN — DOCUSATE SODIUM 50MG AND SENNOSIDES 8.6MG 2 TABLET: 8.6; 5 TABLET, FILM COATED ORAL at 08:21

## 2023-06-09 RX ADMIN — BUSPIRONE HYDROCHLORIDE 10 MG: 10 TABLET ORAL at 08:21

## 2023-06-09 RX ADMIN — ATORVASTATIN CALCIUM 40 MG: 20 TABLET, FILM COATED ORAL at 08:21

## 2023-06-09 RX ADMIN — METOPROLOL SUCCINATE 50 MG: 50 TABLET, FILM COATED, EXTENDED RELEASE ORAL at 08:21

## 2023-06-09 RX ADMIN — HYDROCODONE BITARTRATE AND ACETAMINOPHEN 1 TABLET: 7.5; 325 TABLET ORAL at 00:25

## 2023-06-09 RX ADMIN — PANTOPRAZOLE SODIUM 40 MG: 40 TABLET, DELAYED RELEASE ORAL at 08:21

## 2023-06-09 RX ADMIN — CITALOPRAM 20 MG: 20 TABLET, FILM COATED ORAL at 08:21

## 2023-06-09 RX ADMIN — BUPROPION HYDROCHLORIDE 150 MG: 150 TABLET, EXTENDED RELEASE ORAL at 08:21

## 2023-06-09 RX ADMIN — Medication 1 MG: at 08:21

## 2023-06-09 NOTE — DISCHARGE PLACEMENT REQUEST
"Meghan Adrian (68 y.o. Female)       Date of Birth   1955    Social Security Number       Address   [address] [place] [address]    Home Phone   [phone]    MRN   [id number]       Mormon   Unknown    Marital Status   Unknown                            Admission Date   6/6/23    Admission Type   Emergency    Admitting Provider   Shelby Sales MD    Attending Provider       Department, Room/Bed   [address], P599/1       Discharge Date   6/9/2023    Discharge Disposition   Home or Self Care    Discharge Destination                                 Attending Provider: (none)   Allergies: No Known Allergies    Isolation: None   Infection: None   Code Status: CPR    Ht: 160 cm (63\")   Wt: 80.7 kg (178 lb)    Admission Cmt: None   Principal Problem: Closed fracture of fifth thoracic vertebra, unspecified fracture morphology, initial encounter [S22.059A]                   Active Insurance as of 6/6/2023       Primary Coverage       Payor Plan Insurance Group Employer/Plan Group    HUMANA MEDICARE REPLACEMENT HUMANA MEDICARE REPLACEMENT [id number]       Payor Plan Address Payor Plan Phone Number Payor Plan Fax Number Effective Dates    [address] [address] [phone]  2/1/2021 - None Entered    Formerly [facility] [id number]         Subscriber Name Subscriber Birth Date Member ID       MEGHAN ADRIAN 1955 [id number]                     Emergency Contacts        (Rel.) Home Phone Work Phone Mobile Phone    Kat Beatty (Daughter) [phone] -- --                "

## 2023-06-09 NOTE — DISCHARGE SUMMARY
Date of Admission: 6/6/2023  Date of Discharge:  6/9/2023  Primary Care Physician: Gala Pandey APRN     Discharge Diagnosis:  Active Hospital Problems    Diagnosis  POA    **Closed fracture of fifth thoracic vertebra, unspecified fracture morphology, initial encounter [S22.746F]  Yes    HTN (hypertension) [I10]  Unknown    Stage 3a chronic kidney disease (CKD) [N18.31]  Unknown    Fall [W19.XXXA]  Yes      Resolved Hospital Problems   No resolved problems to display.       DETAILS OF HOSPITAL STAY     Pertinent Test Results and Procedures Performed    MRI of the thoracic spine:  1.  T5 compression fracture is chronic.   2.  No marrow edema or acute compression deformity.   3.  No disc herniation, abnormal cord signal or central spinal stenosis.     Chest CT:  1. Compression deformity T5 vertebrae.   2. No rib or sternal fracture seen.   3. No acute intrathoracic abnormality.     Hospital Course  This is a 68-year-old female who presented to the emergency room after suffering a trip and fall.  This resulted in discomfort in her chest and ribs.  In the emergency room she had a CT scan that showed evidence of T5 fracture.  She also endorsed associated back pain.  Please see H&P for full details of admission.  Neurosurgery was consulted and MRI of the thoracic spine was obtained which showed a chronic appearing T5 compression fracture.  There is no indication for kyphoplasty.  She had no other bony abnormalities.  She has ambulated with therapy services.  Neurosurgery has prescribed her Norco and a back brace and recommend outpatient therapy.  She does not need any follow-up with their service in the office.  She is medically stable and will be released back home.    Physical Exam at Discharge:  General: No acute distress, AAOx3  HEENT: EOMI, PERRL  Cardiovascular: +s1 and s2, RRR  Lungs: No rhonchi or wheezing  Abdomen: soft, nontender    Consults:   Consults       Date and Time Order Name Status  Description    6/6/2023  6:46 PM [facility] (on-call MD unless specified) Details      6/6/2023  5:34 PM Neurosurgery (on-call MD unless specified) Completed               Condition on Discharge: Stable    Discharge Disposition  Home or Self Care    Discharge Medications     Discharge Medications        Continue These Medications        Instructions Start Date   alendronate 35 MG tablet  Commonly known as: FOSAMAX   35 mg, Oral, Every 7 Days      amitriptyline 100 MG tablet  Commonly known as: ELAVIL   100 mg, Oral, Nightly      atorvastatin 40 MG tablet  Commonly known as: LIPITOR   40 mg, Oral, Daily      buPROPion  MG 24 hr tablet  Commonly known as: WELLBUTRIN XL   300 mg, Oral, Daily      busPIRone 10 MG tablet  Commonly known as: BUSPAR   10 mg, Oral, 2 Times Daily      cetirizine 10 MG tablet  Commonly known as: zyrTEC   10 mg, Oral, Daily      citalopram 40 MG tablet  Commonly known as: CeleXA   40 mg, Oral, Daily      Cyanocobalamin 1000 MCG/ML kit   1,000 mcg, Injection, Every 30 Days      dexlansoprazole 60 MG capsule  Commonly known as: DEXILANT   60 mg, Oral, Daily      folic acid 1 MG tablet  Commonly known as: FOLVITE   1 mg, Oral, Daily      HYDROcodone-acetaminophen 7.5-325 MG per tablet  Commonly known as: NORCO   1 tablet, Oral, Every 6 Hours PRN      metoprolol succinate XL 50 MG 24 hr tablet  Commonly known as: TOPROL-XL   50 mg, Oral, Daily      vitamin D 1.25 MG (68720 UT) capsule capsule  Commonly known as: ERGOCALCIFEROL   50,000 Units, Oral, Weekly             Stop These Medications      cephalexin 250 MG capsule  Commonly known as: KEFLEX     famotidine 20 MG tablet  Commonly known as: PEPCID     fluconazole 100 MG tablet  Commonly known as: DIFLUCAN              Discharge Diet:   Diet Instructions       Diet: Regular/House Diet; Regular Texture (IDDSI 7); Thin (IDDSI 0)      Discharge Diet: Regular/House Diet    Texture: Regular Texture (IDDSI 7)    Fluid Consistency: Thin (IDDSI 0)             Activity at Discharge:   Activity Instructions       Activity as Tolerated              Follow-up Appointments  No future appointments.  Additional Instructions for the Follow-ups that You Need to Schedule       Ambulatory Referral to Home Health (Hospital)   As directed      Face to Face Visit Date: 6/9/2023    Follow-up provider for Plan of Care?: I treated the patient in an acute care facility and will not continue treatment after discharge.    Follow-up provider: GALA DAVILA [[id number]]    Reason/Clinical Findings: back pain, T5 fracture    Describe mobility limitations that make leaving home difficult: back pain, T5 fracture    Nursing/Therapeutic Services Requested: Skilled Nursing Physical Therapy Occupational Therapy    Skilled nursing orders: Pain management    Frequency: 1 Week 1         Discharge Follow-up with PCP   As directed       Currently Documented PCP:    Gala Davila APRN    PCP Phone Number:    [phone]     Follow Up Details: 1 week                   I have examined and discussed discharge planning with the patient today.    I wore full protective equipment throughout the patient encounter including eye protection and facemask.  Hand hygiene was performed before donning protective equipment and after removal when leaving the room.     Haile Merrill MD  06/09/23  09:10 EDT    Time: Discharge greater than 30 min

## 2023-06-09 NOTE — PROGRESS NOTES
[facility] to provide Home Care servcies. Patient agreeable and denies any previous HH services. PCP and contact information confirmed. Ok for HH SOC on or before, Monday 6/12/23 per Milly.

## 2023-06-09 NOTE — PROGRESS NOTES
Case Management Discharge Note      Final Note: Home with family and [facility] accepted and arranged with pt/family.         Selected Continued Care - Discharged on 6/9/2023 Admission date: 6/6/2023 - Discharge disposition: Home or Self Care      Destination    No services have been selected for the patient.                Durable Medical Equipment    No services have been selected for the patient.                Dialysis/Infusion    No services have been selected for the patient.                Home Medical Care Coordination complete.      Service Provider Selected Services Address Phone Fax Patient Preferred    [facility] [address] [address] [phone] --              Therapy    No services have been selected for the patient.                Community Resources    No services have been selected for the patient.                Community & DME    No services have been selected for the patient.                    Transportation Services  Private: Car    Final Discharge Disposition Code: 06 - home with home health care

## 2023-06-09 NOTE — PLAN OF CARE
Goal Outcome Evaluation:      VSS. Patient oriented x4. Brace when OOB. Pain managed per MAR. No other significant changes overnight. Patient slept well in between care. Family at bedside.              Problem: Adult Inpatient Plan of Care  Goal: Plan of Care Review  Outcome: Ongoing, Progressing  Goal: Patient-Specific Goal (Individualized)  Outcome: Ongoing, Progressing  Goal: Absence of Hospital-Acquired Illness or Injury  Outcome: Ongoing, Progressing  Intervention: Identify and Manage Fall Risk  Recent Flowsheet Documentation  Taken 6/9/2023 0400 by Bia Campbell RN  Safety Promotion/Fall Prevention: safety round/check completed  Taken 6/9/2023 0253 by Bia Campbell RN  Safety Promotion/Fall Prevention: safety round/check completed  Taken 6/9/2023 0025 by Bia Campbell RN  Safety Promotion/Fall Prevention: safety round/check completed  Taken 6/8/2023 2200 by Bia Campbell RN  Safety Promotion/Fall Prevention: safety round/check completed  Taken 6/8/2023 2050 by Bia Campbell RN  Safety Promotion/Fall Prevention:   activity supervised   assistive device/personal items within reach   clutter free environment maintained   fall prevention program maintained   nonskid shoes/slippers when out of bed   safety round/check completed   gait belt  Intervention: Prevent Skin Injury  Recent Flowsheet Documentation  Taken 6/9/2023 0400 by Bia Campbell RN  Body Position: position changed independently  Taken 6/9/2023 0253 by Bia Campbell RN  Body Position: position changed independently  Taken 6/9/2023 0025 by Bia Campbell RN  Body Position: position changed independently  Taken 6/8/2023 2200 by Bia Campbell RN  Body Position: position changed independently  Taken 6/8/2023 2050 by Bia Campbell RN  Body Position: position changed independently  Intervention: Prevent and Manage VTE (Venous Thromboembolism) Risk  Recent Flowsheet Documentation  Taken 6/8/2023 2050 by Bia Campbell RN  VTE  Prevention/Management:   bilateral   sequential compression devices on  Goal: Optimal Comfort and Wellbeing  Outcome: Ongoing, Progressing  Intervention: Monitor Pain and Promote Comfort  Recent Flowsheet Documentation  Taken 6/8/2023 2050 by Bia Campbell RN  Pain Management Interventions: pain management plan reviewed with patient/caregiver  Goal: Readiness for Transition of Care  Outcome: Ongoing, Progressing     Problem: Hypertension Comorbidity  Goal: Blood Pressure in Desired Range  Outcome: Ongoing, Progressing  Intervention: Maintain Blood Pressure Management  Recent Flowsheet Documentation  Taken 6/8/2023 2050 by Bia Campbell RN  Medication Review/Management: medications reviewed     Problem: Fall Injury Risk  Goal: Absence of Fall and Fall-Related Injury  Outcome: Ongoing, Progressing  Intervention: Identify and Manage Contributors  Recent Flowsheet Documentation  Taken 6/8/2023 2050 by Bia Campbell RN  Medication Review/Management: medications reviewed  Intervention: Promote Injury-Free Environment  Recent Flowsheet Documentation  Taken 6/9/2023 0400 by Bia Campbell RN  Safety Promotion/Fall Prevention: safety round/check completed  Taken 6/9/2023 0253 by Bia Campbell RN  Safety Promotion/Fall Prevention: safety round/check completed  Taken 6/9/2023 0025 by Bia Campbell RN  Safety Promotion/Fall Prevention: safety round/check completed  Taken 6/8/2023 2200 by Bia Campbell RN  Safety Promotion/Fall Prevention: safety round/check completed  Taken 6/8/2023 2050 by Bia Campbell RN  Safety Promotion/Fall Prevention:   activity supervised   assistive device/personal items within reach   clutter free environment maintained   fall prevention program maintained   nonskid shoes/slippers when out of bed   safety round/check completed   gait belt     Problem: Pain Acute  Goal: Acceptable Pain Control and Functional Ability  Outcome: Ongoing, Progressing  Intervention: Prevent or Manage  Pain  Recent Flowsheet Documentation  Taken 6/8/2023 2050 by Bia Campbell RN  Medication Review/Management: medications reviewed  Intervention: Develop Pain Management Plan  Recent Flowsheet Documentation  Taken 6/8/2023 2050 by Bia Campbell RN  Pain Management Interventions: pain management plan reviewed with patient/caregiver

## 2023-06-10 NOTE — OUTREACH NOTE
Prep Survey    Flowsheet Row Responses   [facility] facility patient discharged from? [facility]   Is LACE score < 7 ? No   Eligibility Readm Mgmt   Discharge diagnosis Closed fracture of fifth thoracic vertebra, unspecified fracture   Does the patient have one of the following disease processes/diagnoses(primary or secondary)? Other   Does the patient have Home health ordered? Yes   What is the Home health agency?  [facility]   Is there a DME ordered? No   Prep survey completed? Yes          RAZ SHARMA - Registered Nurse

## 2023-06-12 ENCOUNTER — HOME CARE VISIT (OUTPATIENT)
Dept: HOME HEALTH SERVICES | Facility: HOME HEALTHCARE | Age: 68
End: 2023-06-12
Payer: MEDICARE

## 2023-06-12 PROCEDURE — G0299 HHS/HOSPICE OF RN EA 15 MIN: HCPCS

## 2023-06-12 NOTE — Clinical Note
Dear Dr Gala Pandey,     MICHELLE Adrian   [birth year]    The above named patient was admitted into home health services as of 23.  We will be doing nursing visits twice a week for teaching and instruction on medications, safety, falls prevention, and disease management and assess and eval all systems.  If you have any other needs or any questions, please feel free to contact our agency at [phone].     Thank you so much,     Shirlene ELIN RN   [facility]  [phone]

## 2023-06-13 ENCOUNTER — HOME CARE VISIT (OUTPATIENT)
Dept: HOME HEALTH SERVICES | Facility: HOME HEALTHCARE | Age: 68
End: 2023-06-13
Payer: MEDICARE

## 2023-06-13 VITALS
HEART RATE: 102 BPM | RESPIRATION RATE: 18 BRPM | TEMPERATURE: 97.1 F | DIASTOLIC BLOOD PRESSURE: 70 MMHG | OXYGEN SATURATION: 99 % | SYSTOLIC BLOOD PRESSURE: 116 MMHG

## 2023-06-13 PROCEDURE — G0151 HHCP-SERV OF PT,EA 15 MIN: HCPCS

## 2023-06-13 NOTE — Clinical Note
Please forward to Gala Pandey. APRN    Patient admitted to home health PT services for 2 week 2 for balance retraining, gait training, instruction in orthosis use following T4 compression fracture after recent fall in yard .  Patient with several minor LOB during PT evaluation with and without aid of assistive device.  Do not anticipate patient to remain homebound for long as pain is now well managed and will likely benefit from eventual transition to outpatient therapy for continued balance retraining,

## 2023-06-13 NOTE — HOME HEALTH
"REASON FOR REFERRAL: 68 year old  female  presents with complaints of : back pain following hospitalization from  6/6 to 6/9 following a trip and fall with resulting T5 compression fracture    Home health ordered for: SN, PT, OT    SUBJECTIVE: \"I was running in the yard and slipped in the wet grass\"  Reports fall was on 6/3/23 and states before that had not had any falls in last \"20 years\".       DIAGNOSIS/FOCUS OF CARE:    Closed fracture of fifth thoracic vertebra, unspecified fracture morphology,  HTN  Stage 3a chronic kidney disease  Fall     PRIOR LEVEL OF FUNCTION: patient was independent with self care and mobility; no use of device    PATIENT PHYSICAL THERAPY GOAL(S): \"I don't know. I'm doing pretty good now\"          SOCIAL ENVIRONMENT/ DME /POTENTIAL BARRIERS FOR GOAL ATTAINMENT :    SKIN INTEGRITY/ WOUND STATUS:  see wound care screen for details    CODE STATUS:  full    MEDICATION ISSUES/ CONCERNS:  none identified    HOMEBOUND STATUS: yes - see homebound screen for details    PROBLEMS IDENTIFIED: see care plan    FUNCTIONAL STATUS/ FALL RISK/ SAFETY: see physical therapy evaluation/ care plan       ASSESSMENT: mild SOA with ambulation;  swayback posture and difficulty with L unilateral stance at times leading to mild balance challenges posterolaterally. patient will likely not be homebound long and may benefit from transition from home health to outpatient therapy for balance training however patient does not appear to be receptive to that concept at this time.      PLAN FOR NEXT VISIT:   MEDICAL NECESSITY FOR ONGOING SKILLED THERAPY:  Skilled physical therapy is medically necessary for treatment of: gait, and balance deficits following recent hospitalization for:  pain following fall with T4 compression fracture   .Requires instruction in appropriate progression of exercises; education in fall prevention/pain/edema management; gait training to reduce reliance on assistive device/caregiver; balance " retraining to prevent falls; instruction in orthosis use.  Without skilled physical therapy, patient at risk for: falls, rehospitalization, increased reliance on caregiver, chronic pain,       SPECIFIC INTERVENTIONS AND GOALS TO ADDRESS ON NEXT VISIT:  - progress HEP    - gait training to restore normal mechanics  - fall prevention education  - continued home safety education   - orthosis management    FREQUENCY AND DURATION:  -  2 week 2    ANY OTHER FOLLOW UP NEEDED: none    DATE OF NEXT APPOINTMENT WITH DOCTOR:  ; 8/8 GI;    9/21 nephrology; 7/13 PCP; 6/14/23      REASSESSMENT DUE DATE: 30 day: 7/12/23    Dr. Pandey  electronically notified of POC via case communication on   6 / 13 /23

## 2023-06-14 ENCOUNTER — READMISSION MANAGEMENT (OUTPATIENT)
Dept: CALL CENTER | Facility: HOSPITAL | Age: 68
End: 2023-06-14
Payer: MEDICARE

## 2023-06-14 ENCOUNTER — HOME CARE VISIT (OUTPATIENT)
Dept: HOME HEALTH SERVICES | Facility: HOME HEALTHCARE | Age: 68
End: 2023-06-14
Payer: MEDICARE

## 2023-06-14 VITALS
OXYGEN SATURATION: 98 % | DIASTOLIC BLOOD PRESSURE: 68 MMHG | TEMPERATURE: 97 F | SYSTOLIC BLOOD PRESSURE: 114 MMHG | HEART RATE: 90 BPM | RESPIRATION RATE: 18 BRPM

## 2023-06-14 PROCEDURE — G0152 HHCP-SERV OF OT,EA 15 MIN: HCPCS

## 2023-06-14 NOTE — OUTREACH NOTE
Medical Week 1 Survey      Flowsheet Row Responses   [facility] discharged from? [facility]   Does the patient have one of the following disease processes/diagnoses(primary or secondary)? Other   Week 1 attempt successful? Yes   Call start time 1131   Call end time 1136   Discharge diagnosis Closed fracture of fifth thoracic vertebra, unspecified fracture   Person spoke with today (if not patient) and relationship Patient   Meds reviewed with patient/caregiver? Yes   Does the patient have all medications ordered at discharge? N/A  [No new meds ordered at discharge.]   Is the patient taking all medications as directed (includes completed medication regime)? Yes   Does the patient have a primary care provider?  Yes   Does the patient have an appointment with their PCP within 7 days of discharge? Greater than 7 days   Comments regarding PCP Patient reports that she has a PCP appt for 7/13   Nursing Interventions Verified appointment date/time/provider   Has the patient kept scheduled appointments due by today? N/A   What is the Home health agency?  [facility]   Has home health visited the patient within 72 hours of discharge? Yes   DME comments Wearing back brace as instructed   Psychosocial issues? No   Did the patient receive a copy of their discharge instructions? Yes   Nursing interventions Reviewed instructions with patient   What is the patient's perception of their health status since discharge? Improving   Is the patient/caregiver able to teach back signs and symptoms related to disease process for when to call PCP? Yes   Is the patient/caregiver able to teach back signs and symptoms related to disease process for when to call 911? Yes   Is the patient/caregiver able to teach back the hierarchy of who to call/visit for symptoms/problems? PCP, Specialist, Home health nurse, Urgent Care, ED, 911 Yes   If the patient is a current smoker, are they able to teach back resources for cessation? Not  a smoker   Week 1 call completed? Yes   Graduated Yes   Is the patient interested in additional calls from an ambulatory ?  NOTE:  applies to high risk patients requiring additional follow-up. No   Graduated/Revoked comments Patient reports that she is doing well. She states that she has HH coming in, taking all meds as prescribed and has f/u appt in place. No further calls needed.            LOUIS MURRIETA - Registered Nurse

## 2023-06-15 ENCOUNTER — HOME CARE VISIT (OUTPATIENT)
Dept: HOME HEALTH SERVICES | Facility: HOME HEALTHCARE | Age: 68
End: 2023-06-15
Payer: MEDICARE

## 2023-06-15 VITALS
OXYGEN SATURATION: 99 % | DIASTOLIC BLOOD PRESSURE: 74 MMHG | TEMPERATURE: 98.2 F | SYSTOLIC BLOOD PRESSURE: 128 MMHG | HEART RATE: 89 BPM | RESPIRATION RATE: 18 BRPM

## 2023-06-15 VITALS
RESPIRATION RATE: 18 BRPM | HEART RATE: 86 BPM | OXYGEN SATURATION: 96 % | SYSTOLIC BLOOD PRESSURE: 118 MMHG | DIASTOLIC BLOOD PRESSURE: 82 MMHG

## 2023-06-15 PROCEDURE — G0151 HHCP-SERV OF PT,EA 15 MIN: HCPCS

## 2023-06-15 NOTE — HOME HEALTH
Patient said her worst problem is trying to follow precautions and to do things around the house.  Plan for next visit: ADL, bathroom and kitchen mobility, DME, HEP, UE strength and coordination, standing and activity tolerance, education, falls prevention, home safety  MEDICAL NECESSITY FOR ONGOING SKILLED THERAPY: Patient requires skilled occupational therapy for remediation of deficits to improve activities of daily living, home safety, falls prevention, monitor vital signs, including pulse oximetry, hand/ upper extremity function/range of motion/strength, therapeutic exercise, safety in home, and improve endurance and fatigue management with self care, and functional mobility with durable medical equipment as necessary

## 2023-06-15 NOTE — CASE COMMUNICATION
Anticipate OT 1w1, 2w1, 1w1 for ADL, home safety, falls prevention, monitor vital signs, including pulse oximetry, upper extremity function/strength, therapeutic exercise, safety in home, and improve endurance and fatigue management with self care, and functional mobility with durable medical equipment as necessary.

## 2023-06-15 NOTE — HOME HEALTH
SOC NOTE     Home Health needed for:  medication teaching, safety teaching, disease mgmt teaching     Primary diagnoses/co-morbidities/recent procedures in past 60 days that impact current episode  unspecified fracture of T5-6 vertebra, initial encounter for closed fracture     Current level of functional ability: ambulate with assist of one     Homebound status and living arrangements: considerable effort due to weakness, recent fall, back pain     Skilled need:  teaching and instruction on medications, disease management, safety teaching, falls prevention.  Assess and eval all systems.     Focus of care for next 60 days for each discipline ordered: unspecified fracture of T5-6 vertebra, initial encounter for closed fracture     Skin integrity/wound status: No open areas at this time     Code status: Full    Most recent fall risk: High     Estimated date when home care services will end 8/10/23    Plan of Care confirmed with Dr Pandey on 6/12/23 via in basket.

## 2023-06-15 NOTE — HOME HEALTH
"SUBJECTIVE: \"When do you think I can drive?\"  [facility]  No new med changes.  No recent Falls.    ASSESSMENT improvement in dynamic balance over prior visit. no LOB this visit despite increased balance challenges provided.  still minor SOA with exertion  may not need both of skilled PT visits next week pending improvement in care    SKILL/EDUCATION PROVIDED: see interventions for details  PATIENT/CAREGIVER RESPONSE: see interventions for details  ____________  PLAN FOR NEXT VISIT:   MEDICAL NECESSITY FOR ONGOING SKILLED THERAPY: Skilled physical therapy is medically necessary for treatment of: gait, and balance deficits following recent hospitalization for: pain following fall with T4 compression fracture .Requires instruction in appropriate progression of exercises; education in fall prevention/pain/edema management; gait training to reduce reliance on assistive device/caregiver; balance retraining to prevent falls; instruction in orthosis use. Without skilled physical therapy, patient at risk for: falls, rehospitalization, increased reliance on caregiver, chronic pain,     SPECIFIC INTERVENTIONS AND GOALS TO ADDRESS ON NEXT VISIT:   - progress HEP   - gait training to restore normal mechanics   - fall prevention education   - continued home safety education   - orthosis management     FREQUENCY AND DURATION:   - 2 week 2     ANY OTHER FOLLOW UP NEEDED: none     DATE OF NEXT APPOINTMENT WITH DOCTOR: ; 8/8 GI; 9/21 nephrology; 7/13 PCP;      REASSESSMENT DUE DATE: 30 day: 7/12/23"

## 2023-06-16 ENCOUNTER — HOME CARE VISIT (OUTPATIENT)
Dept: HOME HEALTH SERVICES | Facility: HOME HEALTHCARE | Age: 68
End: 2023-06-16
Payer: MEDICARE

## 2023-06-19 ENCOUNTER — HOME CARE VISIT (OUTPATIENT)
Dept: HOME HEALTH SERVICES | Facility: HOME HEALTHCARE | Age: 68
End: 2023-06-19
Payer: MEDICARE

## 2023-06-19 VITALS
DIASTOLIC BLOOD PRESSURE: 88 MMHG | RESPIRATION RATE: 18 BRPM | HEART RATE: 98 BPM | SYSTOLIC BLOOD PRESSURE: 144 MMHG | OXYGEN SATURATION: 98 % | TEMPERATURE: 97.2 F

## 2023-06-19 PROCEDURE — G0157 HHC PT ASSISTANT EA 15: HCPCS

## 2023-06-19 NOTE — HOME HEALTH
Patient reports having no new concerns at this time. Has bruising on the left forearm due to donning and doffing shoulder straps of lumbar brace. Brace adjusted and patient reeducated on proper donning/doffing strategies. DC notice was signed. Patient not interested in OP at this time.       FALLS SINCE LAST VISIT: No    MEDICATION CHANGES: No    PLAN FOR NEXT VISIT:  Objective measures  Outdoor ambulation  Stair management  Review fall risk and safety management strategies  Review/finalize home exercise program